# Patient Record
Sex: FEMALE | Race: WHITE | NOT HISPANIC OR LATINO | Employment: OTHER | URBAN - METROPOLITAN AREA
[De-identification: names, ages, dates, MRNs, and addresses within clinical notes are randomized per-mention and may not be internally consistent; named-entity substitution may affect disease eponyms.]

---

## 2018-02-27 ENCOUNTER — OFFICE VISIT (OUTPATIENT)
Dept: GASTROENTEROLOGY | Facility: MEDICAL CENTER | Age: 73
End: 2018-02-27
Payer: MEDICARE

## 2018-02-27 VITALS
HEART RATE: 81 BPM | WEIGHT: 102 LBS | TEMPERATURE: 97.1 F | DIASTOLIC BLOOD PRESSURE: 62 MMHG | BODY MASS INDEX: 17 KG/M2 | SYSTOLIC BLOOD PRESSURE: 104 MMHG | HEIGHT: 65 IN

## 2018-02-27 DIAGNOSIS — D64.9 ANEMIA, NORMOCYTIC NORMOCHROMIC: Primary | ICD-10-CM

## 2018-02-27 DIAGNOSIS — R63.4 WEIGHT LOSS: ICD-10-CM

## 2018-02-27 PROBLEM — E78.5 HYPERLIPIDEMIA: Status: ACTIVE | Noted: 2018-02-27

## 2018-02-27 PROCEDURE — 99204 OFFICE O/P NEW MOD 45 MIN: CPT | Performed by: INTERNAL MEDICINE

## 2018-02-27 RX ORDER — MULTIVIT-MIN/IRON/FOLIC ACID/K 18-600-40
1 CAPSULE ORAL
COMMUNITY
End: 2018-04-29 | Stop reason: HOSPADM

## 2018-02-27 RX ORDER — SIMVASTATIN 20 MG
20 TABLET ORAL
COMMUNITY
End: 2018-04-29 | Stop reason: HOSPADM

## 2018-02-27 RX ORDER — METOPROLOL TARTRATE 50 MG/1
TABLET, FILM COATED ORAL
COMMUNITY
End: 2018-04-29 | Stop reason: HOSPADM

## 2018-02-27 RX ORDER — AMLODIPINE BESYLATE 10 MG/1
TABLET ORAL
COMMUNITY
Start: 2017-12-26 | End: 2018-04-29 | Stop reason: HOSPADM

## 2018-02-27 RX ORDER — SEVELAMER CARBONATE 800 MG/1
800 TABLET, FILM COATED ORAL
COMMUNITY
End: 2018-04-29 | Stop reason: HOSPADM

## 2018-02-27 RX ORDER — CINACALCET 30 MG/1
TABLET, FILM COATED ORAL
COMMUNITY
End: 2018-04-29 | Stop reason: HOSPADM

## 2018-02-27 RX ORDER — MELATONIN
COMMUNITY
End: 2018-04-29 | Stop reason: HOSPADM

## 2018-02-27 RX ORDER — POLYETHYLENE GLYCOL 3350 17 G/17G
255 POWDER, FOR SOLUTION ORAL ONCE
Status: CANCELLED | OUTPATIENT
Start: 2018-02-27 | End: 2018-02-27

## 2018-02-27 NOTE — PROGRESS NOTES
[facility] - Outpatient Consultation  Sofía Watson 74 y o  female MRN: [id number]  Encounter: [id number]          ASSESSMENT AND PLAN:      1  Anemia, normocytic normochromic  2  Weight loss    Normocytic anemia may be secondary to ESRD vs AVM vs ulcer  Will plan for GI tract evaluation beginning with EGD and colonoscopy  Next steps would be capsule endsocopy if above endoscopies are unrevealing  Will obtain iron studies    - Case request operating room: EGD AND COLONOSCOPY; Standing  - Case request operating room: EGD AND COLONOSCOPY  - Iron panel    ______________________________________________________________________    HPI:     Ms Rina Castrejon he is a 40-year-old female referred for evaluation of anemia  She has a past medical history of polycystic kidney disease on hemodialysis for the past five years, hypertension, hyperlipidemia  She comes in following routine labs checked by her nephrologist and primary care physician  Hemoglobin trend has fallen from baseline 10 9 to 8 1 over the past one year  The patient has noted no signs of overt GI bleeding; she denies blood in her stool, hematemesis, melena  She does complain of increasing fatigue and lethargy, which has been increasing over the last two weeks  She also complains of increased nausea and vomiting associated with her dialysis sessions  She otherwise denies abdominal pain, early satiety  She has lost 6-7 lb within last month  Additionally she is scheduled for a CT chest, abdomen, pelvis  on Thursday March 1st     She has undergone EGD and colonoscopy over five years ago prior to her initiation of hemodialysis  From her recollection these were within normal limits and she had no colonic polyps  REVIEW OF SYSTEMS:    CONSTITUTIONAL: Denies any fever, chills, rigors, ++ weight loss  HEENT: No earache or tinnitus  Denies hearing loss or visual disturbances  CARDIOVASCULAR: No chest pain or palpitations  RESPIRATORY: Denies any cough, hemoptysis, shortness of breath or dyspnea on exertion  GASTROINTESTINAL: As noted in the History of Present Illness  GENITOURINARY: No problems with urination  Denies any hematuria or dysuria  NEUROLOGIC: No dizziness or vertigo, denies headaches  + fatigue, lethargy  MUSCULOSKELETAL: Denies any muscle or joint pain  SKIN: Denies skin rashes or itching  ENDOCRINE: Denies excessive thirst  Denies intolerance to heat or cold  PSYCHOSOCIAL: Denies depression or anxiety  Denies any recent memory loss  Historical Information   Past Medical History:   Diagnosis Date    Chronic kidney disease     Dyslipidemia      No past surgical history on file  Social History   History   Alcohol Use No     History   Drug Use No     History   Smoking Status    Never Smoker   Smokeless Tobacco    Never Used     No family history on file  Meds/Allergies       Current Outpatient Prescriptions:     amLODIPine (NORVASC) 10 mg tablet    Cholecalciferol (VITAMIN D) 2000 units CAPS    cholecalciferol (VITAMIN D3) 1,000 units tablet    cinacalcet (SENSIPAR) 30 mg tablet    metoprolol tartrate (LOPRESSOR) 50 mg tablet    sevelamer carbonate (RENVELA) 800 mg tablet    simvastatin (ZOCOR) 20 mg tablet    No Known Allergies        Objective     Blood pressure 104/62, pulse 81, temperature (!) 97 1 °F (36 2 °C), temperature source Tympanic, height 5' 5" (1 651 m), weight 46 3 kg (102 lb)  Body mass index is 16 97 kg/m²  PHYSICAL EXAM:      General Appearance:   Alert, cooperative, no distress   HEENT:   Normocephalic, atraumatic, anicteric      Neck:  Supple, symmetrical, trachea midline   Lungs:   Clear to auscultation bilaterally; no rales, rhonchi or wheezing; respirations unlabored    Heart[de-identified]   Regular rate and rhythm; no murmur, rub, or gallop     Abdomen:   Non-tender, mildly distended; normal bowel sounds; cysts of bilateral kidneys in lower quadrants palpable to epigastrium Genitalia:   Deferred    Rectal:   Deferred    Extremities:  No cyanosis, clubbing or edema; LUE fistula + bruit   Pulses:  2+ and symmetric    Skin:  No jaundice, rashes, or lesions    Lymph nodes:  No palpable cervical lymphadenopathy        Lab Results:   No visits with results within 1 Day(s) from this visit  Latest known visit with results is:   Lab Requisition on 02/26/2018   Component Date Value    Hemoglobin 02/26/2018 8 1*    Potassium 02/26/2018 4 8      Hgb 5/10/2016 10 9   Hgb 2/25/2018 9 4 MCV 97    Radiology Results:   No results found

## 2018-02-27 NOTE — LETTER
February 27, 2018     Nimesh Frederick MD  [address] [facility] [address] [facility] [facility] [address]    Patient: Eduardo Seth   YOB: 1945   Date of Visit: 2/27/2018       Dear Dr Faisal Sim: Thank you for referring Ottoniel Armenta to me for evaluation  Below are my notes for this consultation  Please feel free to contact me with questions or concerns  I look forward to following your patient along with you  Sincerely,      HANK V[facility]  [facility]  Mobile: [phone]  Available on [email or web address]             CC: DO Shelton Burleson MD  2/27/2018  1:24 PM  Sign at close encounter  [facility] - Outpatient Consultation  Eduardo Seth 74 y o  female MRN: [id number]  Encounter: [id number]          ASSESSMENT AND PLAN:      1  Anemia, normocytic normochromic  2  Weight loss    Normocytic anemia may be secondary to ESRD vs AVM vs ulcer  Will plan for GI tract evaluation beginning with EGD and colonoscopy  Next steps would be capsule endsocopy if above endoscopies are unrevealing  Will obtain iron studies    - Case request operating room: EGD AND COLONOSCOPY; Standing  - Case request operating room: EGD AND COLONOSCOPY  - Iron panel    ______________________________________________________________________    HPI:     Ms Renee Joya he is a 75-year-old female referred for evaluation of anemia  She has a past medical history of polycystic kidney disease on hemodialysis for the past five years, hypertension, hyperlipidemia  She comes in following routine labs checked by her nephrologist and primary care physician  Hemoglobin trend has fallen from baseline 10 9 to 8 1 over the past one year  The patient has noted no signs of overt GI bleeding; she denies blood in her stool, hematemesis, melena  She does complain of increasing fatigue and lethargy, which has been increasing over the last two weeks    She also complains of increased nausea and vomiting associated with her dialysis sessions  She otherwise denies abdominal pain, early satiety  She has lost 6-7 lb within last month  Additionally she is scheduled for a CT chest, abdomen, pelvis  on Thursday March 1st     She has undergone EGD and colonoscopy over five years ago prior to her initiation of hemodialysis  From her recollection these were within normal limits and she had no colonic polyps  REVIEW OF SYSTEMS:    CONSTITUTIONAL: Denies any fever, chills, rigors, ++ weight loss  HEENT: No earache or tinnitus  Denies hearing loss or visual disturbances  CARDIOVASCULAR: No chest pain or palpitations  RESPIRATORY: Denies any cough, hemoptysis, shortness of breath or dyspnea on exertion  GASTROINTESTINAL: As noted in the History of Present Illness  GENITOURINARY: No problems with urination  Denies any hematuria or dysuria  NEUROLOGIC: No dizziness or vertigo, denies headaches  + fatigue, lethargy  MUSCULOSKELETAL: Denies any muscle or joint pain  SKIN: Denies skin rashes or itching  ENDOCRINE: Denies excessive thirst  Denies intolerance to heat or cold  PSYCHOSOCIAL: Denies depression or anxiety  Denies any recent memory loss  Historical Information   Past Medical History:   Diagnosis Date    Chronic kidney disease     Dyslipidemia      No past surgical history on file  Social History   History   Alcohol Use No     History   Drug Use No     History   Smoking Status    Never Smoker   Smokeless Tobacco    Never Used     No family history on file      Meds/Allergies       Current Outpatient Prescriptions:     amLODIPine (NORVASC) 10 mg tablet    Cholecalciferol (VITAMIN D) 2000 units CAPS    cholecalciferol (VITAMIN D3) 1,000 units tablet    cinacalcet (SENSIPAR) 30 mg tablet    metoprolol tartrate (LOPRESSOR) 50 mg tablet    sevelamer carbonate (RENVELA) 800 mg tablet    simvastatin (ZOCOR) 20 mg tablet    No Known Allergies        Objective     Blood pressure 104/62, pulse 81, temperature (!) 97 1 °F (36 2 °C), temperature source Tympanic, height 5' 5" (1 651 m), weight 46 3 kg (102 lb)  Body mass index is 16 97 kg/m²  PHYSICAL EXAM:      General Appearance:   Alert, cooperative, no distress   HEENT:   Normocephalic, atraumatic, anicteric      Neck:  Supple, symmetrical, trachea midline   Lungs:   Clear to auscultation bilaterally; no rales, rhonchi or wheezing; respirations unlabored    Heart[de-identified]   Regular rate and rhythm; no murmur, rub, or gallop  Abdomen:   Non-tender, mildly distended; normal bowel sounds; cysts of bilateral kidneys in lower quadrants palpable to epigastrium   Genitalia:   Deferred    Rectal:   Deferred    Extremities:  No cyanosis, clubbing or edema; LUE fistula + bruit   Pulses:  2+ and symmetric    Skin:  No jaundice, rashes, or lesions    Lymph nodes:  No palpable cervical lymphadenopathy        Lab Results:   No visits with results within 1 Day(s) from this visit  Latest known visit with results is:   Lab Requisition on 02/26/2018   Component Date Value    Hemoglobin 02/26/2018 8 1*    Potassium 02/26/2018 4 8      Hgb 5/10/2016 10 9   Hgb 2/25/2018 9 4 MCV 97    Radiology Results:   No results found

## 2018-03-19 ENCOUNTER — HOSPITAL ENCOUNTER (OUTPATIENT)
Dept: RADIOLOGY | Facility: HOSPITAL | Age: 73
Discharge: HOME | End: 2018-03-19
Attending: INTERNAL MEDICINE | Admitting: RADIOLOGY
Payer: MEDICARE

## 2018-03-19 VITALS — HEART RATE: 86 BPM | OXYGEN SATURATION: 94 % | SYSTOLIC BLOOD PRESSURE: 157 MMHG | DIASTOLIC BLOOD PRESSURE: 76 MMHG

## 2018-03-19 DIAGNOSIS — N18.6 END STAGE RENAL DISEASE (HCC): ICD-10-CM

## 2018-03-19 PROCEDURE — 36905 THRMBC/NFS DIALYSIS CIRCUIT: CPT

## 2018-03-19 PROCEDURE — C1757 CATH, THROMBECTOMY/EMBOLECT: HCPCS

## 2018-03-19 PROCEDURE — 36905 THRMBC/NFS DIALYSIS CIRCUIT: CPT | Performed by: RADIOLOGY

## 2018-03-19 PROCEDURE — C1894 INTRO/SHEATH, NON-LASER: HCPCS

## 2018-03-19 PROCEDURE — C1725 CATH, TRANSLUMIN NON-LASER: HCPCS

## 2018-03-19 PROCEDURE — C1769 GUIDE WIRE: HCPCS

## 2018-03-19 RX ORDER — FENTANYL CITRATE 50 UG/ML
INJECTION, SOLUTION INTRAMUSCULAR; INTRAVENOUS CODE/TRAUMA/SEDATION MEDICATION
Status: COMPLETED | OUTPATIENT
Start: 2018-03-19 | End: 2018-03-19

## 2018-03-19 RX ADMIN — FENTANYL CITRATE 50 MCG: 50 INJECTION, SOLUTION INTRAMUSCULAR; INTRAVENOUS at 17:41

## 2018-03-19 RX ADMIN — IOHEXOL 55 ML: 300 INJECTION, SOLUTION INTRAVENOUS at 18:51

## 2018-03-19 NOTE — DISCHARGE INSTRUCTIONS
Fistulagram   WHAT YOU NEED TO KNOW:   Your arm or leg my  be sore, swollen, and bruised after the procedure  This is normal and should get better in a few days  DISCHARGE INSTRUCTIONS:     Contact Interventional Radiology at [phone] Yasmin PATIENTS: Contact Interventional Radiology at [phone]) Ольгаandrés Corbin PATIENTS: Contact Interventional Radiology at [phone]) if:    · You have a fever or chills  · Your puncture site is red, swollen, or draining pus  · You have nausea or are vomiting  · Your skin is itchy, swollen, or you have a rash  · You cannot feel a thrill over your graft or fistula  · You have questions or concerns about your condition or care  Seek care immediately if:     · You have bleeding that does not stop after 10 minutes of holding firm, direct pressure over the puncture site  · Blood soaks through your bandage  · Your hand or foot closest to the graft or fistula feels cold, painful, or numb  · Your hand or foot closest to the graft or fistula is pale or blue  · You have trouble moving your arm or leg closest to the graft or fistula  · Your bruise suddenly gets bigger  Care for your wound as directed:  Remove the bandage in 4 to 6 hours or as         directed  Wash the area once a day with soap and water  Gently pat the area dry  Apply firm, steady pressure to the puncture site if it bleeds  Use a clean gauze or towel to hold pressure for 10 to 15 minutes  Call 911 if you cannot stop the bleeding or the bleeding gets heavier  Feel for a thrill once a day or as directed  Place your index and second finger over your fistula or graft as directed  You should feel a vibration  The vibration means that blood is flowing through your graft or fistula correctly  Rest your arm or leg as directed  Do not lift anything heavier than 5 pounds or do strenuous activity for 24 hours  Prevent damage to your graft or fistula    Do not wear tight-fitting clothing over your graft or fistula  Do not wear tight jewelry on the arm or leg with the graft or fistula  Tell healthcare providers not to do, IVs, blood draws, and blood pressure readings in the arm with your graft or fistula  Do not allow flu shots or vaccinations in your arm with your graft or fistula      Follow up with your healthcare provider as directed

## 2018-03-29 ENCOUNTER — TELEPHONE (OUTPATIENT)
Dept: GASTROENTEROLOGY | Facility: CLINIC | Age: 73
End: 2018-03-29

## 2018-04-19 ENCOUNTER — APPOINTMENT (OUTPATIENT)
Dept: LAB | Facility: HOSPITAL | Age: 73
DRG: 682 | End: 2018-04-19
Attending: INTERNAL MEDICINE
Payer: MEDICARE

## 2018-04-19 ENCOUNTER — TRANSCRIBE ORDERS (OUTPATIENT)
Dept: LAB | Facility: HOSPITAL | Age: 73
End: 2018-04-19

## 2018-04-19 DIAGNOSIS — D55.9 ANEMIA DUE TO ENZYME DISORDER (HCC): ICD-10-CM

## 2018-04-19 DIAGNOSIS — D55.9 ANEMIA DUE TO ENZYME DISORDER (HCC): Primary | ICD-10-CM

## 2018-04-19 LAB
ABO GROUP BLD: NORMAL
BLD GP AB SCN SERPL QL: NEGATIVE
RH BLD: NEGATIVE
SPECIMEN EXPIRATION DATE: NORMAL

## 2018-04-19 PROCEDURE — 86901 BLOOD TYPING SEROLOGIC RH(D): CPT

## 2018-04-19 PROCEDURE — 36415 COLL VENOUS BLD VENIPUNCTURE: CPT

## 2018-04-19 PROCEDURE — 86900 BLOOD TYPING SEROLOGIC ABO: CPT

## 2018-04-19 PROCEDURE — 86850 RBC ANTIBODY SCREEN: CPT

## 2018-04-20 ENCOUNTER — APPOINTMENT (EMERGENCY)
Dept: RADIOLOGY | Facility: HOSPITAL | Age: 73
DRG: 682 | End: 2018-04-20
Payer: MEDICARE

## 2018-04-20 ENCOUNTER — HOSPITAL ENCOUNTER (INPATIENT)
Facility: HOSPITAL | Age: 73
LOS: 9 days | DRG: 682 | End: 2018-04-29
Attending: EMERGENCY MEDICINE | Admitting: INTERNAL MEDICINE
Payer: MEDICARE

## 2018-04-20 DIAGNOSIS — R53.1 WEAKNESS: Primary | ICD-10-CM

## 2018-04-20 DIAGNOSIS — R63.4 WEIGHT LOSS: ICD-10-CM

## 2018-04-20 DIAGNOSIS — D64.9 SYMPTOMATIC ANEMIA: ICD-10-CM

## 2018-04-20 DIAGNOSIS — N18.6 ESRD (END STAGE RENAL DISEASE) (HCC): ICD-10-CM

## 2018-04-20 PROBLEM — E46 PROTEIN CALORIE MALNUTRITION (HCC): Status: ACTIVE | Noted: 2018-04-20

## 2018-04-20 PROBLEM — R77.8 ELEVATED TROPONIN: Status: ACTIVE | Noted: 2018-04-20

## 2018-04-20 LAB
ALBUMIN SERPL BCP-MCNC: 1.8 G/DL (ref 3.5–5)
ALP SERPL-CCNC: 121 U/L (ref 46–116)
ALT SERPL W P-5'-P-CCNC: 7 U/L (ref 12–78)
ANION GAP SERPL CALCULATED.3IONS-SCNC: 8 MMOL/L (ref 4–13)
AST SERPL W P-5'-P-CCNC: 18 U/L (ref 5–45)
BASOPHILS # BLD MANUAL: 0 THOUSAND/UL (ref 0–0.1)
BASOPHILS NFR MAR MANUAL: 0 % (ref 0–1)
BILIRUB SERPL-MCNC: 1.18 MG/DL (ref 0.2–1)
BUN SERPL-MCNC: 20 MG/DL (ref 5–25)
CALCIUM SERPL-MCNC: 9.1 MG/DL (ref 8.3–10.1)
CHLORIDE SERPL-SCNC: 99 MMOL/L (ref 100–108)
CO2 SERPL-SCNC: 28 MMOL/L (ref 21–32)
CREAT SERPL-MCNC: 2.93 MG/DL (ref 0.6–1.3)
EOSINOPHIL # BLD MANUAL: 0 THOUSAND/UL (ref 0–0.4)
EOSINOPHIL NFR BLD MANUAL: 0 % (ref 0–6)
ERYTHROCYTE [DISTWIDTH] IN BLOOD BY AUTOMATED COUNT: 17.8 % (ref 11.6–15.1)
GFR SERPL CREATININE-BSD FRML MDRD: 15 ML/MIN/1.73SQ M
GLUCOSE SERPL-MCNC: 102 MG/DL (ref 65–140)
HCT VFR BLD AUTO: 24.3 % (ref 34.8–46.1)
HGB BLD-MCNC: 7.6 G/DL (ref 11.5–15.4)
LYMPHOCYTES # BLD AUTO: 0.34 THOUSAND/UL (ref 0.6–4.47)
LYMPHOCYTES # BLD AUTO: 5 % (ref 14–44)
MCH RBC QN AUTO: 28.5 PG (ref 26.8–34.3)
MCHC RBC AUTO-ENTMCNC: 31.3 G/DL (ref 31.4–37.4)
MCV RBC AUTO: 91 FL (ref 82–98)
MONOCYTES # BLD AUTO: 0.14 THOUSAND/UL (ref 0–1.22)
MONOCYTES NFR BLD: 2 % (ref 4–12)
NEUTROPHILS # BLD MANUAL: 6.32 THOUSAND/UL (ref 1.85–7.62)
NEUTS SEG NFR BLD AUTO: 93 % (ref 43–75)
NRBC BLD AUTO-RTO: 0 /100 WBCS
OVALOCYTES BLD QL SMEAR: PRESENT
PLATELET # BLD AUTO: 102 THOUSANDS/UL (ref 149–390)
PLATELET BLD QL SMEAR: ABNORMAL
PMV BLD AUTO: 12.2 FL (ref 8.9–12.7)
POIKILOCYTOSIS BLD QL SMEAR: PRESENT
POTASSIUM SERPL-SCNC: 4.1 MMOL/L (ref 3.5–5.3)
PROT SERPL-MCNC: 6.7 G/DL (ref 6.4–8.2)
RBC # BLD AUTO: 2.67 MILLION/UL (ref 3.81–5.12)
RBC MORPH BLD: PRESENT
SODIUM SERPL-SCNC: 135 MMOL/L (ref 136–145)
TROPONIN I SERPL-MCNC: 0.08 NG/ML
TROPONIN I SERPL-MCNC: 0.09 NG/ML
WBC # BLD AUTO: 6.8 THOUSAND/UL (ref 4.31–10.16)

## 2018-04-20 PROCEDURE — 80053 COMPREHEN METABOLIC PANEL: CPT | Performed by: EMERGENCY MEDICINE

## 2018-04-20 PROCEDURE — 84484 ASSAY OF TROPONIN QUANT: CPT | Performed by: PHYSICIAN ASSISTANT

## 2018-04-20 PROCEDURE — 30233N1 TRANSFUSION OF NONAUTOLOGOUS RED BLOOD CELLS INTO PERIPHERAL VEIN, PERCUTANEOUS APPROACH: ICD-10-PCS | Performed by: INTERNAL MEDICINE

## 2018-04-20 PROCEDURE — 99285 EMERGENCY DEPT VISIT HI MDM: CPT

## 2018-04-20 PROCEDURE — 84484 ASSAY OF TROPONIN QUANT: CPT | Performed by: EMERGENCY MEDICINE

## 2018-04-20 PROCEDURE — 93005 ELECTROCARDIOGRAM TRACING: CPT | Performed by: EMERGENCY MEDICINE

## 2018-04-20 PROCEDURE — 85007 BL SMEAR W/DIFF WBC COUNT: CPT | Performed by: EMERGENCY MEDICINE

## 2018-04-20 PROCEDURE — 86920 COMPATIBILITY TEST SPIN: CPT

## 2018-04-20 PROCEDURE — 36415 COLL VENOUS BLD VENIPUNCTURE: CPT | Performed by: EMERGENCY MEDICINE

## 2018-04-20 PROCEDURE — 85027 COMPLETE CBC AUTOMATED: CPT | Performed by: EMERGENCY MEDICINE

## 2018-04-20 PROCEDURE — 99223 1ST HOSP IP/OBS HIGH 75: CPT | Performed by: INTERNAL MEDICINE

## 2018-04-20 PROCEDURE — 71046 X-RAY EXAM CHEST 2 VIEWS: CPT

## 2018-04-20 PROCEDURE — 93005 ELECTROCARDIOGRAM TRACING: CPT

## 2018-04-20 RX ORDER — POLYETHYLENE GLYCOL 3350 17 G/17G
17 POWDER, FOR SOLUTION ORAL DAILY PRN
Status: DISCONTINUED | OUTPATIENT
Start: 2018-04-20 | End: 2018-04-27

## 2018-04-20 RX ORDER — PANTOPRAZOLE SODIUM 40 MG/1
40 TABLET, DELAYED RELEASE ORAL
COMMUNITY
Start: 2018-03-03 | End: 2018-04-29 | Stop reason: HOSPADM

## 2018-04-20 RX ORDER — PRAVASTATIN SODIUM 40 MG
40 TABLET ORAL
Status: DISCONTINUED | OUTPATIENT
Start: 2018-04-20 | End: 2018-04-28

## 2018-04-20 RX ORDER — MELATONIN
1000 DAILY
Status: DISCONTINUED | OUTPATIENT
Start: 2018-04-21 | End: 2018-04-28

## 2018-04-20 RX ORDER — POLYETHYLENE GLYCOL 3350 17 G/17G
255 POWDER, FOR SOLUTION ORAL ONCE
Status: DISCONTINUED | OUTPATIENT
Start: 2018-04-20 | End: 2018-04-20

## 2018-04-20 RX ORDER — CALCIUM CARBONATE 200(500)MG
1000 TABLET,CHEWABLE ORAL DAILY PRN
Status: DISCONTINUED | OUTPATIENT
Start: 2018-04-20 | End: 2018-04-29 | Stop reason: HOSPADM

## 2018-04-20 RX ORDER — ACETAMINOPHEN 325 MG/1
650 TABLET ORAL EVERY 6 HOURS PRN
Status: DISCONTINUED | OUTPATIENT
Start: 2018-04-20 | End: 2018-04-29 | Stop reason: HOSPADM

## 2018-04-20 RX ORDER — AMLODIPINE BESYLATE 10 MG/1
10 TABLET ORAL DAILY
Status: DISCONTINUED | OUTPATIENT
Start: 2018-04-21 | End: 2018-04-21

## 2018-04-20 RX ORDER — HEPARIN SODIUM 5000 [USP'U]/ML
5000 INJECTION, SOLUTION INTRAVENOUS; SUBCUTANEOUS EVERY 8 HOURS SCHEDULED
Status: DISCONTINUED | OUTPATIENT
Start: 2018-04-20 | End: 2018-04-28

## 2018-04-20 RX ORDER — ONDANSETRON 2 MG/ML
4 INJECTION INTRAMUSCULAR; INTRAVENOUS EVERY 6 HOURS PRN
Status: DISCONTINUED | OUTPATIENT
Start: 2018-04-20 | End: 2018-04-27

## 2018-04-20 RX ORDER — CINACALCET 30 MG/1
30 TABLET, FILM COATED ORAL
Status: DISCONTINUED | OUTPATIENT
Start: 2018-04-21 | End: 2018-04-29 | Stop reason: HOSPADM

## 2018-04-20 RX ORDER — PANTOPRAZOLE SODIUM 40 MG/1
40 TABLET, DELAYED RELEASE ORAL
Status: DISCONTINUED | OUTPATIENT
Start: 2018-04-21 | End: 2018-04-29 | Stop reason: HOSPADM

## 2018-04-20 RX ORDER — SEVELAMER HYDROCHLORIDE 800 MG/1
800 TABLET, FILM COATED ORAL
Status: DISCONTINUED | OUTPATIENT
Start: 2018-04-20 | End: 2018-04-29 | Stop reason: HOSPADM

## 2018-04-20 RX ADMIN — PRAVASTATIN SODIUM 40 MG: 40 TABLET ORAL at 18:36

## 2018-04-20 RX ADMIN — RENAGEL 800 MG: 800 TABLET ORAL at 18:36

## 2018-04-20 RX ADMIN — HEPARIN SODIUM 5000 UNITS: 5000 INJECTION, SOLUTION INTRAVENOUS; SUBCUTANEOUS at 18:36

## 2018-04-20 NOTE — H&P
H&P- Ronnald Schilder 1945, 74 y o  female MRN: [id number]    Unit/Bed#: WILFRID Encounter: [id number]    Primary Care Provider: Alba Campo MD   Date and time admitted to hospital: 4/20/2018  1:40 PM    * Weakness   Assessment & Plan    · Weakness and failure to thrive- no longer safe to take care of herself at home  · PT/OT  · Consult with case mgmt  · Check TSH        Symptomatic anemia   Assessment & Plan    · Was due to outpatient blood transfusion tomorrow  · Patient would benefit from transfusion at this point given symptoms- ideally could coordinate this with dialysis  · Also was referred to hematologist to r/o hematologic disorder contributing- will obtain heme/onc consult  · Earlier this year underwent EGD/colonscopy at [facility]: Colonoscopy with large internal and external hemorrhoids and small non-bleeding rectal ulcer, EGD with antral gastritis, non erosive duodenitis and hiatal hernia        Elevated troponin   Assessment & Plan    · Troponin mildly elevated- no EKG changes or chest pain  · Trend        Weight loss   Assessment & Plan    · Patient with significant weight loss, appears cachectic  BMI 16 14  · Nutrition evaluation  · Recent colonoscopy and CT c/a/p done at [facility] not revealing of cause          End stage renal disease ([facility] [address] )   Assessment & Plan    · Dialysis MWF  · Having issues with vomiting and hypotension during dialysis  · Nephrology consult for management          VTE Prophylaxis: Heparin  / sequential compression device   Code Status: Full code  POLST: There is no POLST form on file for this patient (pre-hospital)  Discussion with family: Discussed with son and daughter  Anticipated Length of Stay:  Patient will be admitted on an Inpatient basis with an anticipated length of stay of  > 2 midnights  Justification for Hospital Stay: Symptomatic anemia, PT/OT evals  Not safe at home    Total Time for Visit, including Counseling / Coordination of Care: 1 hour    Greater than 50% of this total time spent on direct patient counseling and coordination of care  Chief Complaint:   Weakness    History of Present Illness:    Nichole Parker is a 74 y o  female with a history of ESRD secondary to polycystic kidney disease and anemia who presents with weakness and fatigue  Weakness and fatigue have been ongoing for several weeks, however have been worsening  Today, patient overslept for dialysis  Her son came to pick her up and brought her to dialysis  He states she has stop and rest several times while trying to get dialysis due to fatigue and weakness  He also notes she gets short of breath when she walks  During dialysis, patient's systolic blood pressure was in the 80s  Patient was sent here for evaluation and family is concerned she is unable to care for herself at home anymore  She lives at home by herself  Patient was supposed to get outpatient blood transfusion tomorrow arranged by her nephrologist for symptomatic anemia  She also had an upcoming appointment for evaluation by an oncologist due to anemia  She has lost around 20 lb  Her son states that she does eat well when she is with him but has difficulty preparing meals for herself at home  Reports vomiting during dialysis sessions  She is also newly incontinent of stool  Denies fever, chills, night sweats  No recent medication changes  No focal weakness, numbness, or tingling  Patient had two recent admissions for weakness/fatigue at [facility] and was found to have anemia requiring transfusions  She had a colonoscopy which showed hemorrhoids and nonbleeding rectal ulcer  She also had an EGD which showed gastritis and duodenitis  CT of chest abdomen pelvis showed mild ascites, cholelithiasis, diverticulosis, and innumerable bilateral renal and hepatic cysts      Review of Systems:    Review of Systems    Past Medical and Surgical History:     Past Medical History:   Diagnosis Date    Chronic kidney disease     Dyslipidemia History reviewed  No pertinent surgical history  Meds/Allergies:    Prior to Admission medications    Medication Sig Start Date End Date Taking? Authorizing Provider   amLODIPine (NORVASC) 10 mg tablet TAKE 1 BY MOUTH DAILY 12/26/17  Yes Historical Provider, MD   Cholecalciferol (VITAMIN D) 2000 units CAPS Take 1 tablet by mouth   Yes Historical Provider, MD   cholecalciferol (VITAMIN D3) 1,000 units tablet Take by mouth   Yes Historical Provider, MD   cinacalcet (SENSIPAR) 30 mg tablet Take by mouth   Yes Historical Provider, MD   metoprolol tartrate (LOPRESSOR) 50 mg tablet Take by mouth   Yes Historical Provider, MD   pantoprazole (PROTONIX) 40 mg tablet Take 40 mg by mouth 3/3/18 3/3/19 Yes Historical Provider, MD   sevelamer carbonate (RENVELA) 800 mg tablet Take 800 mg by mouth   Yes Historical Provider, MD   simvastatin (ZOCOR) 20 mg tablet Take 20 mg by mouth   Yes Historical Provider, MD     I have reviewed home medications with patient personally  Allergies: No Known Allergies    Social History:     Marital Status:    Occupation:    Patient Pre-hospital Living Situation: Lives at home  Patient Pre-hospital Level of Mobility: Uses a cane  Patient Pre-hospital Diet Restrictions: None  Substance Use History:   History   Alcohol Use No     History   Smoking Status    Never Smoker   Smokeless Tobacco    Never Used     History   Drug Use No       Family History:    non-contributory    Physical Exam:     Vitals:   Blood Pressure: 110/55 (04/20/18 1529)  Pulse: 83 (04/20/18 1529)  Temperature: (!) 97 4 °F (36 3 °C) (04/20/18 1356)  Temp Source: Oral (04/20/18 1356)  Respirations: 20 (04/20/18 1529)  Weight - Scale: 44 kg (97 lb) (04/20/18 1346)  SpO2: 97 % (04/20/18 1529)    Physical Exam   Constitutional: She is oriented to person, place, and time  Appears cachectic and fatigued   HENT:   Head: Normocephalic and atraumatic  Eyes: No scleral icterus  Neck: Normal range of motion   Neck supple  Cardiovascular: Normal rate, regular rhythm and normal heart sounds  Pulmonary/Chest: Effort normal and breath sounds normal  No respiratory distress  She has no wheezes  She has no rales  Abdominal:   +Palpable kidneys  Mild tenderness right abdomen- normal per patient   Musculoskeletal: She exhibits no edema  Neurological: She is alert and oriented to person, place, and time  Skin: There is pallor  Psychiatric: She has a normal mood and affect  Her behavior is normal  Thought content normal        Additional Data:     Lab Results: I have personally reviewed pertinent reports  Results from last 7 days  Lab Units 04/20/18  1453   WBC Thousand/uL 6 80   HEMOGLOBIN g/dL 7 6*   HEMATOCRIT % 24 3*   PLATELETS Thousands/uL 102*       Results from last 7 days  Lab Units 04/20/18  1453   SODIUM mmol/L 135*   POTASSIUM mmol/L 4 1   CHLORIDE mmol/L 99*   CO2 mmol/L 28   BUN mg/dL 20   CREATININE mg/dL 2 93*   CALCIUM mg/dL 9 1   TOTAL PROTEIN g/dL 6 7   BILIRUBIN TOTAL mg/dL 1 18*   ALK PHOS U/L 121*   ALT U/L 7*   AST U/L 18   GLUCOSE RANDOM mg/dL 102           Imaging: I have personally reviewed pertinent reports  XR chest 2 views   Final Result by Yoana Fowler MD (04/20 1604)      No acute cardiopulmonary disease  Workstation performed: [id number]             EKG, Pathology, and Other Studies Reviewed on Admission:   · EKG: Normal sinus rhythm with PVCs    Allscripts / Epic Records Reviewed: Yes     ** Please Note: This note has been constructed using a voice recognition system   **

## 2018-04-20 NOTE — ASSESSMENT & PLAN NOTE
· Patient with significant weight loss, appears cachectic   BMI 16 14  · Nutrition evaluation  · Recent colonoscopy and CT c/a/p done at [facility] not revealing of cause

## 2018-04-20 NOTE — ASSESSMENT & PLAN NOTE
· Weakness and failure to thrive- no longer safe to take care of herself at home  · PT/OT  · Consult with case mgmt  · Check TSH

## 2018-04-20 NOTE — ASSESSMENT & PLAN NOTE
· Was due to outpatient blood transfusion tomorrow  · Patient would benefit from transfusion at this point given symptoms- ideally could coordinate this with dialysis  · Also was referred to hematologist to r/o hematologic disorder contributing- will obtain heme/onc consult  · Earlier this year underwent EGD/colonscopy at [facility]: Colonoscopy with large internal and external hemorrhoids and small non-bleeding rectal ulcer, EGD with antral gastritis, non erosive duodenitis and hiatal hernia

## 2018-04-20 NOTE — ED PROVIDER NOTES
History  Chief Complaint   Patient presents with    Weakness - Generalized     pt sent from dialysis where she was lethergic and weak  also sent for low H&H     This is a 15-year-old female with history of polycystic kidney disease, end-stage renal disease on dialysis (MWF), AFib not on anticoagulation, anemia of chronic disease who presents with progressive weakness  According to the patient and family, the patient has been experiencing progressive weakness and tiredness over the past few months  This morning, she overslept and missed her bus to dialysis  Her son came to pick her up and take her to dialysis  He noticed more diffuse weakness and difficulty getting into his car  During dialysis, she was noted to be very tired and had a blood pressure with a systolic in the 93H according to her son  She was sent here for evaluation  Of note, she only received approximately 1/2 of her dialysis treatment  As noted earlier, she has experienced progressive weakness over the past few months  She has also become more dyspneic on exertion  She had blood work done 2 days ago which revealed a hemoglobin of 7 4  She is actually scheduled to have a blood transfusion tomorrow  She has been hospitalized at [facility] over the past couple months for blood transfusions  She has also been evaluated by GI for possible GI bleed  According the patient, a stool sample was sent off for evaluation  The patient has no other complaints today besides her chronic abdominal pain secondary to her polycystic kidney disease  Denies fever/chills, nausea/vomiting, lightheadedness/dizziness, numbness, unilateral numbness/weakness, headache, change in vision, URI symptoms, neck pain, chest pain, palpitations, shortness of breath, cough, back pain, flank pain, diarrhea, hematochezia, melena, dysuria, hematuria, abnormal vaginal discharge/bleeding      Of note, the patient is refusing a rectal exam to test for blood in her stool  I explained that this will be a quick exam which will aid in our diagnosis  He will determine whether she has a GI bleed which may also explain her anemia  The patient understands and still refuses a rectal exam      Patient lives at home by herself  Feels like she is too weak to take care of herself at home and is interested in placement to a nursing facility  Family at bedside also agrees that she cannot take care of herself and will need placement  Prior to Admission Medications   Prescriptions Last Dose Informant Patient Reported? Taking? Cholecalciferol (VITAMIN D) 2000 units CAPS   Yes Yes   Sig: Take 1 tablet by mouth   amLODIPine (NORVASC) 10 mg tablet   Yes Yes   Sig: TAKE 1 BY MOUTH DAILY   cholecalciferol (VITAMIN D3) 1,000 units tablet   Yes Yes   Sig: Take by mouth   cinacalcet (SENSIPAR) 30 mg tablet   Yes Yes   Sig: Take by mouth   metoprolol tartrate (LOPRESSOR) 50 mg tablet   Yes Yes   Sig: Take by mouth   pantoprazole (PROTONIX) 40 mg tablet   Yes Yes   Sig: Take 40 mg by mouth   sevelamer carbonate (RENVELA) 800 mg tablet   Yes Yes   Sig: Take 800 mg by mouth   simvastatin (ZOCOR) 20 mg tablet   Yes Yes   Sig: Take 20 mg by mouth      Facility-Administered Medications: None       Past Medical History:   Diagnosis Date    Chronic kidney disease     Dyslipidemia        History reviewed  No pertinent surgical history  History reviewed  No pertinent family history  I have reviewed and agree with the history as documented  Social History   Substance Use Topics    Smoking status: Never Smoker    Smokeless tobacco: Never Used    Alcohol use No        Review of Systems   Constitutional: Negative for chills and fever  HENT: Negative for rhinorrhea, sore throat and trouble swallowing  Eyes: Negative for photophobia and visual disturbance  Respiratory: Positive for shortness of breath  Negative for cough and chest tightness      Cardiovascular: Negative for chest pain, palpitations and leg swelling  Gastrointestinal: Positive for abdominal pain (Chronic)  Negative for blood in stool, diarrhea, nausea and vomiting  Endocrine: Negative for polyuria  Genitourinary: Negative for dysuria, flank pain, hematuria, vaginal bleeding and vaginal discharge  Musculoskeletal: Negative for back pain and neck pain  Skin: Negative for color change and rash  Allergic/Immunologic: Negative for immunocompromised state  Neurological: Positive for weakness  Negative for dizziness, light-headedness, numbness and headaches  All other systems reviewed and are negative  Physical Exam  ED Triage Vitals   Temperature Pulse Respirations Blood Pressure SpO2   04/20/18 1356 04/20/18 1346 04/20/18 1346 04/20/18 1346 04/20/18 1356   (!) 97 4 °F (36 3 °C) 98 18 139/77 94 %      Temp Source Heart Rate Source Patient Position - Orthostatic VS BP Location FiO2 (%)   04/20/18 1356 04/20/18 1346 04/20/18 1430 04/20/18 1529 --   Oral Monitor Lying Right arm       Pain Score       --                  Orthostatic Vital Signs  Vitals:    04/20/18 1346 04/20/18 1430 04/20/18 1529   BP: 139/77 107/53 110/55   Pulse: 98 94 83   Patient Position - Orthostatic VS:  Lying Lying       Physical Exam   Constitutional: Vital signs are normal  She appears cachectic  She is cooperative  No distress  HENT:   Mouth/Throat: Uvula is midline, oropharynx is clear and moist and mucous membranes are normal    Eyes: Conjunctivae and EOM are normal  Pupils are equal, round, and reactive to light  Neck: Trachea normal  No thyroid mass and no thyromegaly present  Cardiovascular: Normal rate, regular rhythm, normal heart sounds, intact distal pulses and normal pulses  No murmur heard  Pulmonary/Chest: Effort normal and breath sounds normal    Abdominal: Soft  Normal appearance and bowel sounds are normal  There is generalized tenderness  There is no rebound, no guarding and no CVA tenderness     Palpable kidneys  Neurological: She is alert  Skin: Skin is warm, dry and intact  Psychiatric: She has a normal mood and affect  Her speech is normal and behavior is normal  Thought content normal        ED Medications  Medications - No data to display    Diagnostic Studies  Results Reviewed     Procedure Component Value Units Date/Time    Comprehensive metabolic panel [[id number]]  (Abnormal) Collected:  04/20/18 1453    Lab Status:  Final result Specimen:  Blood from Arm, Right Updated:  04/20/18 1527     Sodium 135 (L) mmol/L      Potassium 4 1 mmol/L      Chloride 99 (L) mmol/L      CO2 28 mmol/L      Anion Gap 8 mmol/L      BUN 20 mg/dL      Creatinine 2 93 (H) mg/dL      Glucose 102 mg/dL      Calcium 9 1 mg/dL      AST 18 U/L      ALT 7 (L) U/L      Alkaline Phosphatase 121 (H) U/L      Total Protein 6 7 g/dL      Albumin 1 8 (L) g/dL      Total Bilirubin 1 18 (H) mg/dL      eGFR 15 ml/min/1 73sq m     Narrative:         National Kidney Disease Education Program recommendations are as follows:  GFR calculation is accurate only with a steady state creatinine  Chronic Kidney disease less than 60 ml/min/1 73 sq  meters  Kidney failure less than 15 ml/min/1 73 sq  meters  Troponin I [[id number]]  (Abnormal) Collected:  04/20/18 1453    Lab Status:  Final result Specimen:  Blood from Arm, Right Updated:  04/20/18 1525     Troponin I 0 08 (H) ng/mL     Narrative:         [employer] Chemistry analyzer 99% cutoff is > 0 04 ng/mL in network labs    o cTnI 99% cutoff is useful only when applied to patients in the clinical setting of myocardial ischemia  o cTnI 99% cutoff should be interpreted in the context of clinical history, ECG findings and possibly cardiac imaging to establish correct diagnosis  o cTnI 99% cutoff may be suggestive but clearly not indicative of a coronary event without the clinical setting of myocardial ischemia      CBC and differential [[id number]]  (Abnormal) Collected:  04/20/18 1453    Lab Status: Preliminary result Specimen:  Blood from Arm, Right Updated:  04/20/18 1503     WBC 6 80 Thousand/uL      RBC 2 67 (L) Million/uL      Hemoglobin 7 6 (L) g/dL      Hematocrit 24 3 (L) %      MCV 91 fL      MCH 28 5 pg      MCHC 31 3 (L) g/dL      RDW 17 8 (H) %      MPV 12 2 fL      Platelets 509 (L) Thousands/uL                  XR chest 2 views   Final Result by Edie Osborne MD (04/20 1601)      No acute cardiopulmonary disease  Workstation performed: [id number]               Procedures  ECG 12 Lead Documentation  Date/Time: 4/20/2018 3:32 PM  Performed by: Barber Armando  Authorized by: Eva Valverde     ECG reviewed by me, the ED Provider: yes    Patient location:  ED  Previous ECG:     Previous ECG:  Unavailable  Interpretation:     Interpretation: abnormal    Rate:     ECG rate:  85    ECG rate assessment: normal    Rhythm:     Rhythm: sinus rhythm    Ectopy:     Ectopy: PVCs      PVCs:  Multifocal  QRS:     QRS axis:  Normal    QRS intervals:  Normal  Conduction:     Conduction: normal    ST segments:     ST segments:  Normal  T waves:     T waves: normal            Phone Consults  ED Phone Contact    ED Course  ED Course as of Apr 20 1632 Fri Apr 20, 2018   1508 WBC: 6 80   1508 Hemoglobin: (!) 7 6   1508 No previous Platelets: (!) 437   1528 Troponin I: (!) 0 08   1528 Sodium: (!) 135   1528 Potassium: 4 1   1528 BUN: 20   1528 No previous Creatinine: (!) 2 93                               [facility]  Number of Diagnoses or Management Options  Diagnosis management comments: Labs, EKG, chest x-ray  Likely admit      CritCare Time    Disposition  Final diagnoses:   Weakness   ESRD (end stage renal disease) ([facility] [address] )     Time reflects when diagnosis was documented in both MDM as applicable and the Disposition within this note     Time User Action Codes Description Comment    4/20/2018  3:47 PM Efrain Lopez Add [R53 1] Weakness     4/20/2018  3:47 PM Erich Valdovinos Add [N18 6] ESRD (end stage renal disease) [facility])       ED Disposition     ED Disposition Condition Comment    Admit  Case was discussed with Dr Osborn Husbands and the patient's admission status was agreed to be Admission Status: inpatient status to the service of [facility]  Follow-up Information    None       Patient's Medications   Discharge Prescriptions    No medications on file     No discharge procedures on file  ED Provider  Attending physically available and evaluated Ronnald Schilder I managed the patient along with the ED Attending      Electronically Signed by         Jose Anne MD  04/20/18 2819

## 2018-04-20 NOTE — ED ATTENDING ATTESTATION
Davis Erwin MD, saw and evaluated the patient  I have discussed the patient with the resident/non-physician practitioner and agree with the resident's/non-physician practitioner's findings, Plan of Care, and MDM as documented in the resident's/non-physician practitioner's note, except where noted  All available labs and Radiology studies were reviewed  At this point I agree with the current assessment done in the Emergency Department  I have conducted an independent evaluation of this patient a history and physical is as follows:  OA: 73 y/o f with h/o ESRD on dialysis, HTN, HLD, atrial fibrillation and anemia who presents with generalized weakness  Sxms have been progressive x months however this morning sxms were so severe that she overslept her bus for dialysis  Family brought her to dialysis where she was found to be hypotensive and did not complete her dialysis session  Also noted to be increasingly PETTY  + history of needing blood transfusion with hbg of 7/4 2 days ago and scheduled for transfusion tomorrow  No recent falls/trauma  No fevers/chills  No cp/pressure  PE, elderly f, pale and cachectic, PERRL, neck supple/FROM, RR, lungs decreased but CTAB, abd soft, + palpable L kidney, + mild ascites, - LE edema/calf ttp, + 2 distal pulses and capillary refill 2 sec  AAOx3  A/p generalized weakness, concern for anemia v volume overload v combination of processes, labs, imaging, EKG, monitor on telemetry, treat accordingly  Pt and son both discussed with me the need for increased level of care and that the pt is unable to perform her ADLs at this point as she lives alone, will need CM/social work consult for possible placement    Portions of the record may have been created with voice recognition software  Occasional wrong word or sound-a-like" substitutions may have occurred due to the inherent limitations of voice recognition software    Review chart carefully and recognize, using context, where substitutions have occurred    Critical Care Time  CritCare Time    Procedures

## 2018-04-20 NOTE — ASSESSMENT & PLAN NOTE
· Dialysis MWF  · Having issues with vomiting and hypotension during dialysis  · Nephrology consult for management

## 2018-04-21 ENCOUNTER — APPOINTMENT (INPATIENT)
Dept: DIALYSIS | Facility: HOSPITAL | Age: 73
DRG: 682 | End: 2018-04-21
Payer: MEDICARE

## 2018-04-21 ENCOUNTER — APPOINTMENT (INPATIENT)
Dept: RADIOLOGY | Facility: HOSPITAL | Age: 73
DRG: 682 | End: 2018-04-21
Payer: MEDICARE

## 2018-04-21 ENCOUNTER — HOSPITAL ENCOUNTER (OUTPATIENT)
Dept: INFUSION CENTER | Facility: HOSPITAL | Age: 73
End: 2018-04-21

## 2018-04-21 PROBLEM — R26.2 AMBULATORY DYSFUNCTION: Status: ACTIVE | Noted: 2018-03-14

## 2018-04-21 PROBLEM — M81.0 OSTEOPOROSIS: Status: ACTIVE | Noted: 2017-02-21

## 2018-04-21 LAB
FERRITIN SERPL-MCNC: 1747 NG/ML (ref 8–388)
FOLATE SERPL-MCNC: 3.5 NG/ML (ref 3.1–17.5)
IRON SATN MFR SERPL: 22 %
IRON SERPL-MCNC: 15 UG/DL (ref 50–170)
PLATELET # BLD AUTO: 72 THOUSANDS/UL (ref 149–390)
PMV BLD AUTO: 11.6 FL (ref 8.9–12.7)
TIBC SERPL-MCNC: 68 UG/DL (ref 250–450)
TROPONIN I SERPL-MCNC: 0.09 NG/ML
TSH SERPL DL<=0.05 MIU/L-ACNC: 3.13 UIU/ML (ref 0.36–3.74)
VIT B12 SERPL-MCNC: 610 PG/ML (ref 100–900)

## 2018-04-21 PROCEDURE — 83540 ASSAY OF IRON: CPT | Performed by: INTERNAL MEDICINE

## 2018-04-21 PROCEDURE — 82607 VITAMIN B-12: CPT | Performed by: INTERNAL MEDICINE

## 2018-04-21 PROCEDURE — 85049 AUTOMATED PLATELET COUNT: CPT | Performed by: PHYSICIAN ASSISTANT

## 2018-04-21 PROCEDURE — 99222 1ST HOSP IP/OBS MODERATE 55: CPT | Performed by: INTERNAL MEDICINE

## 2018-04-21 PROCEDURE — G8988 SELF CARE GOAL STATUS: HCPCS

## 2018-04-21 PROCEDURE — 74176 CT ABD & PELVIS W/O CONTRAST: CPT

## 2018-04-21 PROCEDURE — 86334 IMMUNOFIX E-PHORESIS SERUM: CPT | Performed by: INTERNAL MEDICINE

## 2018-04-21 PROCEDURE — 83550 IRON BINDING TEST: CPT | Performed by: INTERNAL MEDICINE

## 2018-04-21 PROCEDURE — 82668 ASSAY OF ERYTHROPOIETIN: CPT | Performed by: INTERNAL MEDICINE

## 2018-04-21 PROCEDURE — 97166 OT EVAL MOD COMPLEX 45 MIN: CPT

## 2018-04-21 PROCEDURE — P9040 RBC LEUKOREDUCED IRRADIATED: HCPCS

## 2018-04-21 PROCEDURE — 82728 ASSAY OF FERRITIN: CPT | Performed by: INTERNAL MEDICINE

## 2018-04-21 PROCEDURE — 99232 SBSQ HOSP IP/OBS MODERATE 35: CPT | Performed by: INTERNAL MEDICINE

## 2018-04-21 PROCEDURE — 82746 ASSAY OF FOLIC ACID SERUM: CPT | Performed by: INTERNAL MEDICINE

## 2018-04-21 PROCEDURE — 71250 CT THORAX DX C-: CPT

## 2018-04-21 PROCEDURE — 86334 IMMUNOFIX E-PHORESIS SERUM: CPT | Performed by: PATHOLOGY

## 2018-04-21 PROCEDURE — 99221 1ST HOSP IP/OBS SF/LOW 40: CPT | Performed by: INTERNAL MEDICINE

## 2018-04-21 PROCEDURE — 84165 PROTEIN E-PHORESIS SERUM: CPT | Performed by: INTERNAL MEDICINE

## 2018-04-21 PROCEDURE — 84443 ASSAY THYROID STIM HORMONE: CPT | Performed by: PHYSICIAN ASSISTANT

## 2018-04-21 PROCEDURE — 84165 PROTEIN E-PHORESIS SERUM: CPT | Performed by: PATHOLOGY

## 2018-04-21 PROCEDURE — G8987 SELF CARE CURRENT STATUS: HCPCS

## 2018-04-21 RX ORDER — HEPARIN SODIUM 1000 [USP'U]/ML
2000 INJECTION, SOLUTION INTRAVENOUS; SUBCUTANEOUS AS NEEDED
Status: DISCONTINUED | OUTPATIENT
Start: 2018-04-21 | End: 2018-04-28

## 2018-04-21 RX ADMIN — AMLODIPINE BESYLATE 10 MG: 10 TABLET ORAL at 08:20

## 2018-04-21 RX ADMIN — VITAMIN D, TAB 1000IU (100/BT) 1000 UNITS: 25 TAB at 08:20

## 2018-04-21 RX ADMIN — CINACALCET HYDROCHLORIDE 30 MG: 30 TABLET, COATED ORAL at 08:20

## 2018-04-21 RX ADMIN — PRAVASTATIN SODIUM 40 MG: 40 TABLET ORAL at 16:30

## 2018-04-21 RX ADMIN — HEPARIN SODIUM 5000 UNITS: 5000 INJECTION, SOLUTION INTRAVENOUS; SUBCUTANEOUS at 21:31

## 2018-04-21 RX ADMIN — ONDANSETRON 4 MG: 2 INJECTION INTRAMUSCULAR; INTRAVENOUS at 08:24

## 2018-04-21 RX ADMIN — RENAGEL 800 MG: 800 TABLET ORAL at 08:20

## 2018-04-21 RX ADMIN — RENAGEL 800 MG: 800 TABLET ORAL at 16:30

## 2018-04-21 RX ADMIN — RENAGEL 800 MG: 800 TABLET ORAL at 12:24

## 2018-04-21 RX ADMIN — PANTOPRAZOLE SODIUM 40 MG: 40 TABLET, DELAYED RELEASE ORAL at 06:04

## 2018-04-21 RX ADMIN — HEPARIN SODIUM 5000 UNITS: 5000 INJECTION, SOLUTION INTRAVENOUS; SUBCUTANEOUS at 06:05

## 2018-04-21 NOTE — ASSESSMENT & PLAN NOTE
· Troponin mildly elevated- no EKG changes or chest pain  · Trend is flat, likely related to end-stage renal disease  · Patient has no symptoms of chest pain, shortness of breath or any other angina equivalents at this time  · Possible component related to type 2 secondary to severe anemia

## 2018-04-21 NOTE — PLAN OF CARE
Problem: OCCUPATIONAL THERAPY ADULT  Goal: Performs self-care activities at highest level of function for planned discharge setting  See evaluation for individualized goals  Treatment Interventions: ADL retraining, Functional transfer training, UE strengthening/ROM, Endurance training, Patient/family training, Equipment evaluation/education, Compensatory technique education, Fine motor coordination activities, Continued evaluation, Energy conservation, Activityengagement          See flowsheet documentation for full assessment, interventions and recommendations  Limitation: Decreased ADL status, Decreased UE strength, Decreased Safe judgement during ADL, Decreased endurance, Decreased self-care trans, Decreased high-level ADLs, Decreased fine motor control  Prognosis: Fair  Assessment: Pt is a 73 y/o female seen for OT eval s/p adm to [facility] w/ increased weakness and fatigue  Pt has a history of ESRD secondary to polycystic kidney disease and anemia Comorbidities include a h/o CKD, dyslipidemia, A-fib, end stage renal disease, HTN, HLD, weight loss  Pt with active OT orders and up w/ A  Pt lives alone in senior housing apartment w/ no DENA   Pt requires assist w/  ADLS and IADLS, does not drive, & required use of DME PTA including a SPC  Pt is currently demonstrating the following occupational deficits: Min A UB ADLS, Mod A LB ADLS, Min A transfers and functional mobility w/ hand held assist/SPC  Pt with deficits and limitations in all baseline areas of occupation 2* fatigue, nausea, decreased strength and endurance, generalized weakness and deconditioning, decreased supportive home environment, decreased functional mobility and activity tolerance, decreased I w/ ADLS and IADLS compared to previous level of functioning   The following Occupational Performance Areas to address include: eating, grooming, bathing/shower, toilet hygiene, dressing, medication management, socialization, health maintenance, functional mobility, community mobility, clothing management, meal prep and social participation  Pt scored overall 50/100 on the Barthel Index  Based on the aforementioned OT evaluation, functional performance deficits, and assessments, pt has been identified as a moderate complexity evaluation  Recommend pt d/c to STR when medically stable    Pt to continue to benefit from acute immediate OT services to address the following goals 3-5x/wk to  w/in 7-10 days:     OT Discharge Recommendation: Short Term Rehab  OT - OK to Discharge: Yes (when medically stable)      Comments: Rocio MUNIZ, OTR/L

## 2018-04-21 NOTE — ASSESSMENT & PLAN NOTE
· Acute on chronic anemia, possibly related to kidney disease versus other causes  · Earlier this year underwent EGD/colonscopy at [facility]: Colonoscopy with large internal and external hemorrhoids and small non-bleeding rectal ulcer, EGD with antral gastritis, non erosive duodenitis and hiatal hernia  · Hematology evaluated the patient, sent iron panel, I have added erythropoietin level  · CT chest abdomen and pelvis negative for malignant  · Component of very poor nutritional status  · Consult Gastroenterology to inquire if will be worthwhile to repeat endoscopy at this point  · She is currently status post transfusion of 1 unit packed red blood cells in dialysis today  · Of note, thrombocytopenia noted, Okay to continue with heparin for DVT prophylaxis at this time, as long as platelets above 50

## 2018-04-21 NOTE — ASSESSMENT & PLAN NOTE
· Multifactorial, likely related to very poor nutritional status with weight loss as well as symptomatic anemia  · PT and OT to evaluate patient  · Treat underlying causes

## 2018-04-21 NOTE — CONSULTS
Oncology Consult Note  Carrington Ruby 74 y o  female MRN: [id number]  Unit/Bed#: [facility] 808-01 Encounter: [id number]      Presenting Complaint:  Anemia and weight loss  History of Presenting Illness:  A 75-year-old female with history of polycystic kidney  As a result, she has end-stage renal disease  She has been on hemodialysis for last 5 years  She was hospitalized with progressive weakness as well as weight loss  According to the chart, she has lost 20 lb  She has no complaint of pain  She was smoker for 40 years until 2009  However, she has no respiratory symptoms such as cough, sputum production or shortness of breast   She feels quite weak  We do not have any prior iron study, F84 or folic acid  Her hemoglobin is below 8  She has mild thrombocytopenia with 100,000 of platelet count  Her WBC is normal  CMP showed normal level of total protein and severely decreased albumin level, suggestion malnutrition and possibly increased gammaglobulin  She has no complaint of bone pain  Review of Systems - As stated in the HPI otherwise the fourteen point review of systems was negative  Past Medical History:   Diagnosis Date    Chronic kidney disease     Dyslipidemia        Social History     Social History    Marital status:      Spouse name: N/A    Number of children: N/A    Years of education: N/A     Social History Main Topics    Smoking status: Never Smoker    Smokeless tobacco: Never Used    Alcohol use No    Drug use: No    Sexual activity: Not Asked     Other Topics Concern    None     Social History Narrative    None       History reviewed  No pertinent family history      No Known Allergies      Current Facility-Administered Medications:     acetaminophen (TYLENOL) tablet 650 mg, 650 mg, Oral, Q6H PRN, Johann Caro PA-C    amLODIPine (NORVASC) tablet 10 mg, 10 mg, Oral, Daily, Johann Caro PA-C, 10 mg at 04/21/18 0820    calcium carbonate (TUMS) chewable tablet 1,000 mg, 1,000 mg, Oral, Daily PRN, ROSA Blanco-C    cholecalciferol (VITAMIN D3) tablet 1,000 Units, 1,000 Units, Oral, Daily, ROSA Blanco-C, 1,000 Units at 04/21/18 0820    cinacalcet (SENSIPAR) tablet 30 mg, 30 mg, Oral, Daily With Breakfast, ROSA Blanco-C, 30 mg at 04/21/18 0820    heparin (porcine) subcutaneous injection 5,000 Units, 5,000 Units, Subcutaneous, Q8H [address], 5,000 Units at 04/21/18 0605 **AND** Platelet count, , , Once, ROSA Blanco-CLAUDIA    ondansetron [facility]) injection 4 mg, 4 mg, Intravenous, Q6H PRN, ROSA Blanco-C, 4 mg at 04/21/18 0824    pantoprazole (PROTONIX) EC tablet 40 mg, 40 mg, Oral, Early Morning, Yoana Paula PA-C, 40 mg at 04/21/18 0604    polyethylene glycol (MIRALAX) packet 17 g, 17 g, Oral, Daily PRN, Yoana Paula PA-C    pravastatin (PRAVACHOL) tablet 40 mg, 40 mg, Oral, Daily With [employer], PA-C, 40 mg at 04/20/18 1836    sevelamer (RENAGEL) tablet 800 mg, 800 mg, Oral, TID With Meals, ROSA Blanco-C, 800 mg at 04/21/18 0820      /53 (BP Location: Right arm)   Pulse 93   Temp 98 9 °F (37 2 °C) (Oral)   Resp 16   Ht 5' 5 5" (1 664 m)   Wt 43 3 kg (95 lb 8 oz)   SpO2 93%   BMI 15 65 kg/m²     General Appearance:    Alert, oriented , she appeared to be cachectic  Eyes:    PERRL   Ears:    Normal external ear canals, both ears   Nose:   Nares normal, septum midline   Throat:   Mucosa moist  Pharynx without injection  Neck:   Supple       Lungs:     Clear to auscultation bilaterally   Chest Wall:    No tenderness or deformity    Heart:    Regular rate and rhythm       Abdomen:     Mild tenderness in epigastrium  Large kidney are palpable bilaterally  Mild abdominal distention  Extremities:   Extremities no cyanosis or edema       Skin:   no rash or icterus      Lymph nodes:   Cervical, supraclavicular, and axillary nodes normal   Neurologic:   CNII-XII intact, normal strength, sensation and reflexes     Throughout               Recent Results (from the past 48 hour(s))   Type and screen    Collection Time: 04/19/18  1:18 PM   Result Value Ref Range    ABO Grouping O     Rh Factor Negative     Antibody Screen Negative     Specimen Expiration Date [id number]    CBC and differential    Collection Time: 04/20/18  2:53 PM   Result Value Ref Range    WBC 6 80 4 31 - 10 16 Thousand/uL    RBC 2 67 (L) 3 81 - 5 12 Million/uL    Hemoglobin 7 6 (L) 11 5 - 15 4 g/dL    Hematocrit 24 3 (L) 34 8 - 46 1 %    MCV 91 82 - 98 fL    MCH 28 5 26 8 - 34 3 pg    MCHC 31 3 (L) 31 4 - 37 4 g/dL    RDW 17 8 (H) 11 6 - 15 1 %    MPV 12 2 8 9 - 12 7 fL    Platelets 581 (L) 315 - 390 Thousands/uL    nRBC 0 /100 WBCs   Comprehensive metabolic panel    Collection Time: 04/20/18  2:53 PM   Result Value Ref Range    Sodium 135 (L) 136 - 145 mmol/L    Potassium 4 1 3 5 - 5 3 mmol/L    Chloride 99 (L) 100 - 108 mmol/L    CO2 28 21 - 32 mmol/L    Anion Gap 8 4 - 13 mmol/L    BUN 20 5 - 25 mg/dL    Creatinine 2 93 (H) 0 60 - 1 30 mg/dL    Glucose 102 65 - 140 mg/dL    Calcium 9 1 8 3 - 10 1 mg/dL    AST 18 5 - 45 U/L    ALT 7 (L) 12 - 78 U/L    Alkaline Phosphatase 121 (H) 46 - 116 U/L    Total Protein 6 7 6 4 - 8 2 g/dL    Albumin 1 8 (L) 3 5 - 5 0 g/dL    Total Bilirubin 1 18 (H) 0 20 - 1 00 mg/dL    eGFR 15 ml/min/1 73sq m   Troponin I    Collection Time: 04/20/18  2:53 PM   Result Value Ref Range    Troponin I 0 08 (H) <=0 04 ng/mL   Manual Differential(PHLEBS Do Not Order)    Collection Time: 04/20/18  2:53 PM   Result Value Ref Range    Segmented % 93 (H) 43 - 75 %    Lymphocytes % 5 (L) 14 - 44 %    Monocytes % 2 (L) 4 - 12 %    Eosinophils % 0 0 - 6 %    Basophils % 0 0 - 1 %    Absolute Neutrophils 6 32 1 85 - 7 62 Thousand/uL    Lymphocytes Absolute 0 34 (L) 0 60 - 4 47 Thousand/uL    Monocytes Absolute 0 14 0 00 - 1 22 Thousand/uL    Eosinophils Absolute 0 00 0 00 - 0 40 Thousand/uL Basophils Absolute 0 00 0 00 - 0 10 Thousand/uL    Total Counted      RBC Morphology Present     Ovalocytes Present     Poikilocytes Present     Platelet Estimate Decreased (A) Adequate   Troponin I    Collection Time: 04/20/18  7:08 PM   Result Value Ref Range    Troponin I 0 09 (H) <=0 04 ng/mL   Prepare RBC:Has consent been obtained? Yes, 1 Units    Collection Time: 04/20/18  8:11 PM   Result Value Ref Range    Unit Product Code [id number]     Unit Number [id number]     Unit ABO O     Unit RH NEG     Crossmatch Compatible     Unit Dispense Status Crossmatched    Troponin I    Collection Time: 04/20/18 11:31 PM   Result Value Ref Range    Troponin I 0 09 (H) <=0 04 ng/mL   TSH, 3rd generation    Collection Time: 04/21/18  5:11 AM   Result Value Ref Range    TSH 3RD GENERATON 3 130 0 358 - 3 740 uIU/mL         Xr Chest 2 Views    Result Date: 4/20/2018  Narrative: CHEST INDICATION:   weakness  COMPARISON:  None EXAM PERFORMED/VIEWS:  XR CHEST PA & LATERAL FINDINGS: Heart shadow appears unremarkable  Atherosclerotic vascular calcifications are noted  The lungs are clear  No pneumothorax or pleural effusion  Osseous structures appear within normal limits for patient age  Impression: No acute cardiopulmonary disease  Workstation performed: [id number]       Assessment:  Anemia and mild thrombocytopenia  20 lb weight loss  Ex-smoker  Plan:  A 78-year-old female with history as described above  Regarding her anemia, I would obtain iron study, Z30 and folic acid  Since she has relatively wide gap between total protein and albumin, I think it is reasonable to test SPEP to rule out monoclonal protein  She was smoker for 40 years  Although, chest x-ray she is was negative, I think it is reasonable to obtain CT scan of chest abdomen pelvis without IV contrast to rule out malignancy, since she has 20 lb weight loss  She is in agreement with my recommendations

## 2018-04-21 NOTE — PROGRESS NOTES
Pt refusing to wear sequential compression devices for legs, but aggreeable to take subcutaneous heparin  Dr Domonique Freeman made aware  No new orders

## 2018-04-21 NOTE — ASSESSMENT & PLAN NOTE
· Discussed with daughter, patient with very sudden weight loss of about 20 lb in the last 3 months, at this time patient seems refusing to eat most of her meals  · Daughter voices concerns that mother might have underlying depression versus having lost the will of continue with dialysis and treat her multiple medical problems  · Discussed with patient, patient cannot give me and explanation of why her p o  intake support, other than she does not feel like eating most of the time  · She is open to try nutritional supplements  · I have consulted palliative care for non pain symptoms management including anorexia, anxiety with trichotillomania, and also clarification of goal of care for the patient  · If patient p o  does not improve, will need to approach her for possible supplemental feeding via PEG tube

## 2018-04-21 NOTE — OCCUPATIONAL THERAPY NOTE
[address]  Evaluation     Patient Name: Primo Benítez  AOGLZ'B Date: 4/21/2018  Problem List  Patient Active Problem List   Diagnosis    [facility] [facility]    End stage renal disease ([facility] [facility] )    Hemodialysis patient ([facility] [facility] )    Hypertension    Polycystic kidney    Hyperlipidemia    Anemia, normocytic normochromic    Weakness    Weight loss    Symptomatic anemia    Elevated troponin     Past Medical History  Past Medical History:   Diagnosis Date    Chronic kidney disease     Dyslipidemia      Past Surgical History  History reviewed  No pertinent surgical history  04/21/18 0835   Note Type   Note type Eval/Treat   Restrictions/Precautions   Weight Bearing Precautions Per Order No   Other Precautions Fall Risk;Pain   Pain Assessment   Pain Assessment No/denies pain   Pain Score No Pain   Home Living   Type of Home Apartment   Home Layout One level; Access   Bathroom Shower/Tub Tub/shower unit   [facility] Grab bars around toilet;Grab bars in [address]   Additional Comments Pt reports living in 1 story senior apartment w/ no DENA   Prior Function   Level of [place] Needs assistance with IADLs; Needs assistance with ADLs and functional mobility   Lives With Alone   Receives Help From Family  (son and daughter)   ADL Assistance Needs assistance   IADLs Needs assistance   Vocational Retired   Comments Pt reports requiring assist for ADLS (son/dght helps w/ bathing) pt reports being able to dress independently  Requires assist for IADLS, and is Mod I for transfers and functional mobility w/ [facility]   Lifestyle   Autonomy Pt reports requiring assist for ADLS (son/dght helps w/ bathing) pt reports being able to dress independently   Requires assist for IADLS, and is Mod I for transfers and functional mobility w/ [facility]   Reciprocal Relationships Pt lives alone; has son and dght hek   Service to Others Pt is retired   Intrinsic Gratification Pt reports enjoying walking   Psychosocial   Psychosocial (WDL) WDL   ADL   Eating Assistance 5  Supervision/Setup   Grooming Assistance 5  Supervision/Setup   UB Bathing Assistance 4  Minimal Assistance   LB Bathing Assistance 3  Moderate Assistance   UB Dressing Assistance 4  Minimal [address] 3  Moderate [address]  5  Supervision/Setup   Functional Assistance 4  Minimal Assistance   Bed Mobility   Rolling L 5  Supervision   Additional items HOB elevated; Bedrails; Increased time required   Supine to Sit 5  Supervision   Additional items Increased time required;Verbal cues;HOB elevated; Bedrails   Sit to Supine Unable to assess   Additional Comments Pt went from supine to sit w/ S and HOB elevated w/ use of bed rails and increased time  Pt able to roll L and then prop self up into sitting  Able to maintain sitting EOB balance w/ S for safety  Transfers   Sit to Stand 4  Minimal assistance   Additional items Assist x 1; Increased time required;Verbal cues   Stand to Sit 4  Minimal assistance   Additional items Assist x 1; Increased time required;Verbal cues   Stand pivot 4  Minimal assistance   Additional items Assist x 1; Increased time required;Verbal cues   Additional Comments Pt performed sit-stand from EOB w/ Min A x1 for safety and balance and mild force production into standing   Then performed SPT from bed to chair and stand-sit to chair all w/ hand held assist     Functional Mobility   Functional Mobility 4  Minimal assistance   Additional Comments Pt took few small steps in room w/ Min A x1, used hand held assist but usually ambulated w/ [facility]   Balance   Static Sitting Fair +   Dynamic Sitting Fair -   Static Standing Poor +   Ambulatory Poor +   Activity Tolerance   Activity Tolerance Patient limited by fatigue   Nurse Made Aware yes- present in room to give meds   RUE Assessment   RUE Assessment WFL   LUE Assessment   LUE Assessment WFL   Hand Function Gross Motor Coordination Functional   Fine Motor Coordination Functional   Cognition   Overall Cognitive Status WFL   Arousal/Participation Alert; Responsive; Cooperative   Attention Within functional limits   Orientation Level Oriented X4   Memory Within functional limits   Following Commands Follows one step commands without difficulty   Comments Pt is pleasant and cooperative   Assessment   Limitation Decreased ADL status; Decreased UE strength;Decreased Safe judgement during ADL;Decreased endurance;Decreased self-care trans;Decreased high-level ADLs; Decreased fine motor control   Prognosis Fair   Assessment Pt is a 73 y/o female seen for OT eval s/p adm to [facility] w/ increased weakness and fatigue  Pt has a history of ESRD secondary to polycystic kidney disease and anemia Comorbidities include a h/o CKD, dyslipidemia, A-fib, end stage renal disease, HTN, HLD, weight loss  Pt with active OT orders and up w/ A  Pt lives alone in senior housing apartment w/ no DENA   Pt requires assist w/  ADLS and IADLS, does not drive, & required use of DME PTA including a SPC  Pt is currently demonstrating the following occupational deficits: Min A UB ADLS, Mod A LB ADLS, Min A transfers and functional mobility w/ hand held assist/SPC  Pt with deficits and limitations in all baseline areas of occupation 2* fatigue, nausea, decreased strength and endurance, generalized weakness and deconditioning, decreased supportive home environment, decreased functional mobility and activity tolerance, decreased I w/ ADLS and IADLS compared to previous level of functioning  The following Occupational Performance Areas to address include: eating, grooming, bathing/shower, toilet hygiene, dressing, medication management, socialization, health maintenance, functional mobility, community mobility, clothing management, meal prep and social participation  Pt scored overall 50/100 on the Barthel Index   Based on the aforementioned OT evaluation, functional performance deficits, and assessments, pt has been identified as a moderate complexity evaluation  Recommend pt d/c to STR when medically stable   Pt to continue to benefit from acute immediate OT services to address the following goals 3-5x/wk to  w/in 7-10 days:   Goals   Patient Goals to go to rehab   LTG Time Frame 7-10   Long Term Goal #1 see below listed goals   Plan   Treatment Interventions ADL retraining;Functional transfer training;UE strengthening/ROM; Endurance training;Patient/family training;Equipment evaluation/education; Compensatory technique education; Fine motor coordination activities;Continued evaluation; Energy conservation; Activityengagement   Goal Expiration Date 18   OT Frequency 3-5x/wk   Recommendation   OT Discharge Recommendation Short Term Rehab   OT - OK to Discharge Yes  (when medically stable)   Barthel Index   Feeding 5   Bathing 0   Grooming Score 5   Dressing Score 5   Bladder Score 10   Bowels Score 10   Toilet Use Score 5   Transfers (Bed/Chair) Score 10   Mobility (Level Surface) Score 0   Stairs Score 0   Barthel Index Score 50   Modified [place] Scale   Modified [place] Scale 4        GOALS    1) Pt will improve activity tolerance to G for min 30 min txment sessions    2) Pt will complete UB/LB dressing/self care w/ S using adaptive device and DME as needed    3) Pt will complete bathing w/ S w/ use of AE and DME as needed    4) Pt will complete toileting w/ S w/ G hygiene/thoroughness using DME as needed    5) Pt will improve functional transfers to S on/off all surfaces using DME as needed w/ G balance/safety     6) Pt will improve functional mobility during ADL/IADL/leisure tasks to S using DME as needed w/ G balance/safety     7) Pt will engage in ongoing cognitive assessment w/ G participation w/ S to assist w/ safe d/c planning/recommendations    8) Pt will demonstrate G carryover of pt/caregiver education and training as appropriate w/ S w/o cues w/ good tolerance    9) Pt will demonstrate 100% carryover of energy conservation techniques w/ S t/o functional I/ADL/leisure tasks w/o cues s/p skilled education    10) Pt will tolerate PROM/AROM/AAROM in all planes w/ S w/ G participation to improve fx'l UB use as prerequisite for I/ADL/leisure    [facility] MOT, OTR/L

## 2018-04-21 NOTE — PROGRESS NOTES
Progress Note - Alexys Gan 1945, 74 y o  female MRN: [id number]    Unit/Bed#: [facility] 808-01 Encounter: [id number]    Primary Care Provider: Suyapa Barkley MD   Date and time admitted to hospital: 4/20/2018  1:40 PM        Elevated troponin   Assessment & Plan    · Troponin mildly elevated- no EKG changes or chest pain  · Trend is flat, likely related to end-stage renal disease  · Patient has no symptoms of chest pain, shortness of breath or any other angina equivalents at this time  · Possible component related to type 2 secondary to severe anemia        Symptomatic anemia   Assessment & Plan    · Acute on chronic anemia, possibly related to kidney disease versus other causes  · Earlier this year underwent EGD/colonscopy at [facility]: Colonoscopy with large internal and external hemorrhoids and small non-bleeding rectal ulcer, EGD with antral gastritis, non erosive duodenitis and hiatal hernia  · Hematology evaluated the patient, sent iron panel, I have added erythropoietin level  · CT chest abdomen and pelvis negative for malignant  · Component of very poor nutritional status  · Consult Gastroenterology to inquire if will be worthwhile to repeat endoscopy at this point  · She is currently status post transfusion of 1 unit packed red blood cells in dialysis today  · Of note, thrombocytopenia noted, Okay to continue with heparin for DVT prophylaxis at this time, as long as platelets above 50        Weight loss   Assessment & Plan    · Discussed with daughter, patient with very sudden weight loss of about 20 lb in the last 3 months, at this time patient seems refusing to eat most of her meals  · Daughter voices concerns that mother might have underlying depression versus having lost the will of continue with dialysis and treat her multiple medical problems  · Discussed with patient, patient cannot give me and explanation of why her p o  intake support, other than she does not feel like eating most of the time  · She is open to try nutritional supplements  · I have consulted palliative care for non pain symptoms management including anorexia, anxiety with trichotillomania, and also clarification of goal of care for the patient  · If patient p o  does not improve, will need to approach her for possible supplemental feeding via PEG tube          End stage renal disease ([facility] [address] )   Assessment & Plan    · Dialysis MWF  · Nauseous during dialysis, limited ultrafiltration secondary to hypotension, Nephrology has discontinue calcium channel blocker for the patient  · Continue with dialysis as per Nephrology recommendation, last episode today        * Weakness   Assessment & Plan    · Multifactorial, likely related to very poor nutritional status with weight loss as well as symptomatic anemia  · PT and OT to evaluate patient  · Treat underlying causes          VTE Pharmacologic Prophylaxis: yes  Pharmacologic: Heparin  Mechanical VTE Prophylaxis in Place: Yes    Patient Centered Rounds: I have performed bedside rounds with nursing staff today  Discussions with Specialists or Other Care Team Provider:  Oncology    Education and Discussions with Family / Patient:  Patient, daughter    Time Spent for Care: 45 minutes  More than 50% of total time spent on counseling and coordination of care as described above  Current Length of Stay: 1 day(s)    Current Patient Status: Inpatient   Certification Statement: The patient will continue to require additional inpatient hospital stay due to Refer to above    Discharge Plan: To be determined although patient would like to speak with  to go to nursing home    Code Status: Level 1 - Full Code      Subjective:   Patient has no complaints other than mild nausea while on dialysis      Objective:     Vitals:   Temp (24hrs), Av 7 °F (37 1 °C), Min:97 6 °F (36 4 °C), Max:99 6 °F (37 6 °C)    HR:  [75-94] 82  Resp:  [12-18] 12  BP: ()/(25-53) 105/36  SpO2:  [93 %-100 %] 93 %  Body mass index is 15 65 kg/m²  Input and Output Summary (last 24 hours): Intake/Output Summary (Last 24 hours) at 04/21/18 1617  Last data filed at 04/21/18 1530   Gross per 24 hour   Intake             1090 ml   Output              502 ml   Net              588 ml       Physical Exam:     Physical Exam   Constitutional: She is oriented to person, place, and time  Cachectic   Cardiovascular: Normal rate, regular rhythm and normal heart sounds  Exam reveals no friction rub  No murmur heard  Pulmonary/Chest: Effort normal  No respiratory distress  She has no wheezes  She has no rales  Abdominal: Soft  She exhibits no distension  There is no tenderness  There is no rebound  Musculoskeletal: She exhibits no edema  Neurological: She is alert and oriented to person, place, and time  She exhibits normal muscle tone  Skin: Skin is warm  Psychiatric: She has a normal mood and affect  Additional Data:     Labs:      Results from last 7 days  Lab Units 04/21/18  1348 04/20/18  1453   WBC Thousand/uL  --  6 80   HEMOGLOBIN g/dL  --  7 6*   HEMATOCRIT %  --  24 3*   PLATELETS Thousands/uL 72* 102*   LYMPHO PCT %  --  5*   MONO PCT MAN %  --  2*   EOSINO PCT MANUAL %  --  0       Results from last 7 days  Lab Units 04/20/18  1453   SODIUM mmol/L 135*   POTASSIUM mmol/L 4 1   CHLORIDE mmol/L 99*   CO2 mmol/L 28   BUN mg/dL 20   CREATININE mg/dL 2 93*   CALCIUM mg/dL 9 1   TOTAL PROTEIN g/dL 6 7   BILIRUBIN TOTAL mg/dL 1 18*   ALK PHOS U/L 121*   ALT U/L 7*   AST U/L 18   GLUCOSE RANDOM mg/dL 102           * I Have Reviewed All Lab Data Listed Above  * Additional Pertinent Lab Tests Reviewed:  All Labs Within Last 24 Hours Reviewed    Imaging:    Imaging Reports Reviewed Today Include:  None  Imaging Personally Reviewed by Myself Includes:  None    Recent Cultures (last 7 days):           Last 24 Hours Medication List:     Current Facility-Administered Medications:  acetaminophen 650 mg Oral Q6H PRN Yoana Paula PA-C   calcium carbonate 1,000 mg Oral Daily PRN Yoana Paula PA-C   cholecalciferol 1,000 Units Oral Daily [place] JACY Paula   cinacalcet 30 mg Oral Daily With Breakfast Yoana Paula PA-C   [START ON 4/23/2018] epoetin beth 4,000 Units Intravenous After Dialysis Sveta Mayen, DO   And       [START ON 4/23/2018] epoetin beth 3,000 Units Intravenous After Dialysis Sveta Mayen DO   heparin (porcine) 2,000 Units Intravenous PRN [facility],    heparin (porcine) 5,000 Units Subcutaneous Q8H [facility] Yoana Paula PA-C   ondansetron 4 mg Intravenous Q6H PRN Midbradley Paula PA-C   pantoprazole 40 mg Oral Early Morning Midbradley Paula PA-C   polyethylene glycol 17 g Oral Daily PRN Midbradley Paula PA-C   pravastatin 40 mg Oral Daily With [employer]AC[employer]   sevelamer 800 mg Oral TID With Meals Yoana Paula PA-C        Today, Patient Was Seen By: Wandy John MD    ** Please Note: Dragon 360 Dictation voice to text software may have been used in the creation of this document   **

## 2018-04-21 NOTE — ASSESSMENT & PLAN NOTE
· Dialysis MWF  · Nauseous during dialysis, limited ultrafiltration secondary to hypotension, Nephrology has discontinue calcium channel blocker for the patient  · Continue with dialysis as per Nephrology recommendation, last episode today

## 2018-04-21 NOTE — CONSULTS
Consultation - Nephrology   Shelton Dorsey 74 y o  female MRN: [id number]  [facility] Encounter: [id number]      Assessment/Plan:  1  End-stage renal disease, maintenance hemodialysis Monday Wednesday Friday, Ondina Mclaughlin  2  Acute on chronic anemia, status post PRBC transfusion, continue with Epogen  3  Generalized weakness fatigue  4  Failure to thrive  5  Discrepant total protein and albumin, serum electrophoresis pending  6  Hypotension, discontinue calcium channel blocker  7  Mineral bone disorder, continue with Sensipar    Plan:  · CT scan of the chest abdomen pelvis without evidence of malignancy  · 1 unit PRBC with hemodialysis today  · Limited ultrafiltration given patient's blood pressure  · Discontinue calcium channel blocker  · Hematology oncology following    History of Present Illness   Physician Requesting Consult: Shavonne Hinds MD  Reason for Consult / Principal Problem:  End-stage renal disease  HPI: Shelton Dorsey is a 74y o  year old female who presents with weakness and fatigue  Patient is a 64-year-old female with known history of polycystic kidney disease, end-stage renal disease receiving hemodialysis Monday Wednesday Friday  During dialysis yesterday patient's blood pressure was noted be in the 80s and was sent for further evaluation  Patient does report near 20 lb weight loss  Appetite has been poor  Again weakness and fatigue  No significant chest pain or shortness of breath  Denies any lower extremity swelling  No abdominal pain mild abdominal distention    History obtained from chart review and the patient    Review of Systems    Review of Systems: pertinent findings as noted above otherwise negative    Historical Information   Patient Active Problem List   Diagnosis    Atrial fibrillation [facility]    End stage renal disease [facility] [address] [facility]    Hemodialysis patient ([facility] [address] [facility]    Hypertension    Polycystic kidney    Hyperlipidemia    Anemia, normocytic normochromic    Weakness    Weight loss    Symptomatic anemia    Elevated troponin     Past Medical History:   Diagnosis Date    Chronic kidney disease     Dyslipidemia      History reviewed  No pertinent surgical history  Social History   History   Alcohol Use No     History   Drug Use No     History   Smoking Status    Never Smoker   Smokeless Tobacco    Never Used     History reviewed  No pertinent family history      Meds/Allergies   current meds:   Current Facility-Administered Medications   Medication Dose Route Frequency    acetaminophen (TYLENOL) tablet 650 mg  650 mg Oral Q6H PRN    calcium carbonate (TUMS) chewable tablet 1,000 mg  1,000 mg Oral Daily PRN    cholecalciferol (VITAMIN D3) tablet 1,000 Units  1,000 Units Oral Daily    cinacalcet (SENSIPAR) tablet 30 mg  30 mg Oral Daily With Breakfast    [START ON 4/23/2018] epoetin beth (EPOGEN,PROCRIT) injection 4,000 Units  4,000 Units Intravenous After Dialysis    And    [START ON 4/23/2018] epoetin beth (EPOGEN,PROCRIT) injection 3,000 Units  3,000 Units Intravenous After Dialysis    heparin (porcine) injection 2,000 Units  2,000 Units Intravenous PRN    heparin (porcine) subcutaneous injection 5,000 Units  5,000 Units Subcutaneous Q8H [facility]    ondansetron (ZOFRAN) injection 4 mg  4 mg Intravenous Q6H PRN    pantoprazole (PROTONIX) EC tablet 40 mg  40 mg Oral Early Morning    polyethylene glycol (MIRALAX) packet 17 g  17 g Oral Daily PRN    pravastatin (PRAVACHOL) tablet 40 mg  40 mg Oral Daily With Dinner    sevelamer (RENAGEL) tablet 800 mg  800 mg Oral TID With Meals       No Known Allergies      Objective   BP (!) 93/25   Pulse 80   Temp 97 6 °F (36 4 °C)   Resp 12   Ht 5' 5 5" (1 664 m)   Wt 43 3 kg (95 lb 8 oz)   SpO2 93%   BMI 15 65 kg/m²     Intake/Output Summary (Last 24 hours) at 04/21/18 1502  Last data filed at 04/21/18 1453   Gross per 24 hour   Intake              790 ml   Output                0 ml   Net              790 ml Current Weight: Weight - Scale: 43 3 kg (95 lb 8 oz)    Physical Exam   Constitutional: She is oriented to person, place, and time  She appears cachectic  HENT:   Head: Normocephalic  Eyes: Conjunctivae are normal    Neck: Neck supple  Cardiovascular: Normal rate and regular rhythm  Pulmonary/Chest: Effort normal and breath sounds normal    Abdominal: Soft  Bowel sounds are normal  There is no tenderness  Musculoskeletal: She exhibits no edema  Neurological: She is alert and oriented to person, place, and time  Skin: Skin is warm and dry  Psychiatric: She has a normal mood and affect           Lab Results:      Results from last 7 days  Lab Units 04/21/18  1348 04/20/18  1453   WBC Thousand/uL  --  6 80   HEMOGLOBIN g/dL  --  7 6*   HEMATOCRIT %  --  24 3*   PLATELETS Thousands/uL 72* 102*       Results from last 7 days  Lab Units 04/20/18  1453   SODIUM mmol/L 135*   POTASSIUM mmol/L 4 1   CHLORIDE mmol/L 99*   CO2 mmol/L 28   BUN mg/dL 20   CREATININE mg/dL 2 93*   GLUCOSE RANDOM mg/dL 102   CALCIUM mg/dL 9 1

## 2018-04-21 NOTE — PLAN OF CARE
Problem: Nutrition/Hydration-ADULT  Goal: Nutrient/Hydration intake appropriate for improving, restoring or maintaining nutritional needs  Monitor and assess patient's nutrition/hydration status for malnutrition (ex- brittle hair, bruises, dry skin, pale skin and conjunctiva, muscle wasting, smooth red tongue, and disorientation)  Collaborate with interdisciplinary team and initiate plan and interventions as ordered  Monitor patient's weight and dietary intake as ordered or per policy  Utilize nutrition screening tool and intervene per policy  Determine patient's food preferences and provide high-protein, high-caloric foods as appropriate  INTERVENTIONS:  - Monitor oral intake, urinary output, labs, and treatment plans  - Assess nutrition and hydration status and recommend course of action  - Evaluate amount of meals eaten  - Assist patient with eating if necessary   - Allow adequate time for meals  - Recommend/ encourage appropriate diets, oral nutritional supplements, and vitamin/mineral supplements  - Order, calculate, and assess calorie counts as needed  - Recommend, monitor, and adjust tube feedings and TPN/PPN based on assessed needs  - Assess need for intravenous fluids  - Provide specific nutrition/hydration education as appropriate  - Include patient/family/caregiver in decisions related to nutrition    Outcome: Not Progressing  Needs much encouragement to eat- continue to offer Ensure enlive TID   Consider Appetite enhancer

## 2018-04-21 NOTE — SOCIAL WORK
CM met with patient and daughter Aron Nova ([phone]) at bedside  Pt resides alone in a 1st floor apartment at [address] [place] [address]  No DENA  Prior to admission patient was independent with ADLs although Pt's daughter states that she has become increasing weak within the last 3 - 4 months  Pt's daughter reports that Pt hospitalized 2x in 3 months at [facility] and refused [address] service  Pt uses a can to ambulate  No other DME  Patient receives dialysis at [facility]  Bus transports patient to from dialysis  Family transports to other appointments  Pt reports medications are supplied from [facility]  No history of HHC or STR  No POA  Pt's daughter Aron Nova and son Jazlyn La ([phone]) are both involved and Pt is agreeable to both children's involvement in decision making  Pt and family are interested in a referral to [place] if PT/OT recommend rehab  No history of MH or D/A treatment  CM reviewed d/c planning process including the following: identifying help at home, patient preference for d/c planning needs, Discharge Lounge, [facility] Meds to Bed program, availability of treatment team to discuss questions or concerns patient and/or family may have regarding understanding medications and recognizing signs and symptoms once discharged  CM also encouraged patient to follow up with all recommended appointments after discharge  Patient advised of importance for patient and family to participate in managing patients medical well being

## 2018-04-22 LAB
ABO GROUP BLD BPU: NORMAL
ANION GAP SERPL CALCULATED.3IONS-SCNC: 8 MMOL/L (ref 4–13)
BASOPHILS # BLD AUTO: 0.01 THOUSANDS/ΜL (ref 0–0.1)
BASOPHILS NFR BLD AUTO: 0 % (ref 0–1)
BPU ID: NORMAL
BUN SERPL-MCNC: 20 MG/DL (ref 5–25)
CALCIUM SERPL-MCNC: 9 MG/DL (ref 8.3–10.1)
CHLORIDE SERPL-SCNC: 100 MMOL/L (ref 100–108)
CO2 SERPL-SCNC: 28 MMOL/L (ref 21–32)
CREAT SERPL-MCNC: 2.87 MG/DL (ref 0.6–1.3)
CROSSMATCH: NORMAL
EOSINOPHIL # BLD AUTO: 0.01 THOUSAND/ΜL (ref 0–0.61)
EOSINOPHIL NFR BLD AUTO: 0 % (ref 0–6)
ERYTHROCYTE [DISTWIDTH] IN BLOOD BY AUTOMATED COUNT: 18.3 % (ref 11.6–15.1)
GFR SERPL CREATININE-BSD FRML MDRD: 15 ML/MIN/1.73SQ M
GLUCOSE SERPL-MCNC: 79 MG/DL (ref 65–140)
HCT VFR BLD AUTO: 26.6 % (ref 34.8–46.1)
HGB BLD-MCNC: 8.4 G/DL (ref 11.5–15.4)
LACTATE SERPL-SCNC: 2.1 MMOL/L (ref 0.5–2)
LYMPHOCYTES # BLD AUTO: 0.45 THOUSANDS/ΜL (ref 0.6–4.47)
LYMPHOCYTES NFR BLD AUTO: 8 % (ref 14–44)
MAGNESIUM SERPL-MCNC: 2 MG/DL (ref 1.6–2.6)
MCH RBC QN AUTO: 28.6 PG (ref 26.8–34.3)
MCHC RBC AUTO-ENTMCNC: 31.6 G/DL (ref 31.4–37.4)
MCV RBC AUTO: 91 FL (ref 82–98)
MONOCYTES # BLD AUTO: 0.31 THOUSAND/ΜL (ref 0.17–1.22)
MONOCYTES NFR BLD AUTO: 5 % (ref 4–12)
NEUTROPHILS # BLD AUTO: 5.03 THOUSANDS/ΜL (ref 1.85–7.62)
NEUTS SEG NFR BLD AUTO: 87 % (ref 43–75)
NRBC BLD AUTO-RTO: 0 /100 WBCS
PHOSPHATE SERPL-MCNC: 3.6 MG/DL (ref 2.3–4.1)
PLATELET # BLD AUTO: 71 THOUSANDS/UL (ref 149–390)
POTASSIUM SERPL-SCNC: 4.4 MMOL/L (ref 3.5–5.3)
RBC # BLD AUTO: 2.94 MILLION/UL (ref 3.81–5.12)
SODIUM SERPL-SCNC: 136 MMOL/L (ref 136–145)
UNIT DISPENSE STATUS: NORMAL
UNIT PRODUCT CODE: NORMAL
UNIT RH: NORMAL
WBC # BLD AUTO: 5.83 THOUSAND/UL (ref 4.31–10.16)

## 2018-04-22 PROCEDURE — 99232 SBSQ HOSP IP/OBS MODERATE 35: CPT | Performed by: INTERNAL MEDICINE

## 2018-04-22 PROCEDURE — 83605 ASSAY OF LACTIC ACID: CPT | Performed by: PHYSICIAN ASSISTANT

## 2018-04-22 PROCEDURE — 84100 ASSAY OF PHOSPHORUS: CPT | Performed by: INTERNAL MEDICINE

## 2018-04-22 PROCEDURE — 80048 BASIC METABOLIC PNL TOTAL CA: CPT | Performed by: INTERNAL MEDICINE

## 2018-04-22 PROCEDURE — 83735 ASSAY OF MAGNESIUM: CPT | Performed by: INTERNAL MEDICINE

## 2018-04-22 PROCEDURE — 85025 COMPLETE CBC W/AUTO DIFF WBC: CPT | Performed by: INTERNAL MEDICINE

## 2018-04-22 PROCEDURE — 99222 1ST HOSP IP/OBS MODERATE 55: CPT | Performed by: INTERNAL MEDICINE

## 2018-04-22 RX ORDER — MIRTAZAPINE 15 MG/1
15 TABLET, FILM COATED ORAL
Status: DISCONTINUED | OUTPATIENT
Start: 2018-04-22 | End: 2018-04-29 | Stop reason: HOSPADM

## 2018-04-22 RX ORDER — DEXAMETHASONE 2 MG/1
2 TABLET ORAL DAILY
Status: DISCONTINUED | OUTPATIENT
Start: 2018-04-22 | End: 2018-04-27

## 2018-04-22 RX ADMIN — HEPARIN SODIUM 5000 UNITS: 5000 INJECTION, SOLUTION INTRAVENOUS; SUBCUTANEOUS at 05:39

## 2018-04-22 RX ADMIN — MIRTAZAPINE 15 MG: 15 TABLET, FILM COATED ORAL at 21:25

## 2018-04-22 RX ADMIN — PANTOPRAZOLE SODIUM 40 MG: 40 TABLET, DELAYED RELEASE ORAL at 05:39

## 2018-04-22 RX ADMIN — POLYETHYLENE GLYCOL 3350, SODIUM SULFATE ANHYDROUS, SODIUM BICARBONATE, SODIUM CHLORIDE, POTASSIUM CHLORIDE 4000 ML: 236; 22.74; 6.74; 5.86; 2.97 POWDER, FOR SOLUTION ORAL at 16:48

## 2018-04-22 RX ADMIN — VITAMIN D, TAB 1000IU (100/BT) 1000 UNITS: 25 TAB at 09:15

## 2018-04-22 RX ADMIN — DEXAMETHASONE 2 MG: 2 TABLET ORAL at 14:35

## 2018-04-22 RX ADMIN — PRAVASTATIN SODIUM 40 MG: 40 TABLET ORAL at 16:48

## 2018-04-22 RX ADMIN — CINACALCET HYDROCHLORIDE 30 MG: 30 TABLET, COATED ORAL at 09:15

## 2018-04-22 RX ADMIN — HEPARIN SODIUM 5000 UNITS: 5000 INJECTION, SOLUTION INTRAVENOUS; SUBCUTANEOUS at 14:35

## 2018-04-22 RX ADMIN — RENAGEL 800 MG: 800 TABLET ORAL at 11:55

## 2018-04-22 RX ADMIN — HEPARIN SODIUM 5000 UNITS: 5000 INJECTION, SOLUTION INTRAVENOUS; SUBCUTANEOUS at 21:25

## 2018-04-22 RX ADMIN — RENAGEL 800 MG: 800 TABLET ORAL at 16:48

## 2018-04-22 NOTE — PROGRESS NOTES
NEPHROLOGY PROGRESS NOTE   Fozia Up 74 y o  female MRN: [id number]  Unit/Bed#: [facility] 808-01 Encounter: [id number]  Reason for Consult: ESRD    Assessment/Plan:  1  End-stage renal disease, maintenance hemodialysis Monday Wednesday Friday, Ondina Mclaughlin  2  Acute on chronic anemia, status post PRBC transfusion, continue with Epogen  3  Enlarged liver with noted liver cysts  4  Ascites  5  Polycystic kidney disease   6  Generalized weakness fatigue  7  Failure to thrive  8  Discrepant total protein and albumin, serum electrophoresis pending  9  Hypotension, discontinue calcium channel blocker  10  Mineral bone disorder, continue with Sensipar    PLAN:  · Await GI evaluation, ? Paracentesis  · For comfort whether paracentesis would be beneficial ? , also would consider possibly looking at any severely enlarged liver cysts for possible sclerosis  · Continue maintenance hemodialysis Monday Wednesday Friday  · Continue monitor hemoglobin closely    SUBJECTIVE:  Seen and examined  Patient awake alert  Still with poor appetite  Complains of abdominal distention, pain especially with palpation  No significant chest pain or shortness of Breath  Review of Systems    OBJECTIVE:  Current Weight: Weight - Scale: 43 3 kg (95 lb 8 oz)  Vitals:    04/21/18 1530 04/21/18 1643 04/21/18 2306 04/22/18 0700   BP: (!) 105/36 104/52 113/53 122/57   BP Location:  Right arm Right arm Right arm   Pulse: 82 74 93 102   Resp:  20 20 20   Temp:  98 2 °F (36 8 °C) 98 2 °F (36 8 °C) 98 °F (36 7 °C)   TempSrc:  Oral Oral Oral   SpO2:  93% 93% 93%   Weight:       Height:           Intake/Output Summary (Last 24 hours) at 04/22/18 1334  Last data filed at 04/22/18 1200   Gross per 24 hour   Intake             1115 ml   Output              502 ml   Net              613 ml       Physical Exam   Constitutional: No distress  HENT:   Head: Normocephalic  Eyes: Conjunctivae are normal    Neck: Neck supple     Cardiovascular: Normal rate and regular rhythm  Pulmonary/Chest: Effort normal and breath sounds normal    Abdominal: She exhibits distension and ascites  There is tenderness  Musculoskeletal: She exhibits no edema  Neurological: She is alert  Skin: Skin is dry         Medications:    Current Facility-Administered Medications:     acetaminophen (TYLENOL) tablet 650 mg, 650 mg, Oral, Q6H PRN, Martin Betty, PA-C    calcium carbonate (TUMS) chewable tablet 1,000 mg, 1,000 mg, Oral, Daily PRN, Martin Rudye, PA-C    cholecalciferol (VITAMIN D3) tablet 1,000 Units, 1,000 Units, Oral, Daily, Martin Dole, PA-C, 1,000 Units at 04/22/18 0915    cinacalcet (SENSIPAR) tablet 30 mg, 30 mg, Oral, Daily With Breakfast, Martin Dole, PA-C, 30 mg at 04/22/18 0915    dexamethasone (DECADRON) tablet 2 mg, 2 mg, Oral, Daily, MD Almita Spain  [START ON 4/23/2018] epoetin beth (EPOGEN,PROCRIT) injection 4,000 Units, 4,000 Units, Intravenous, After Dialysis **AND** [START ON 4/23/2018] epoetin beth (EPOGEN,PROCRIT) injection 3,000 Units, 3,000 Units, Intravenous, After Dialysis, Eric Thornton, DO    heparin (porcine) injection 2,000 Units, 2,000 Units, Intravenous, PRN, Belle Mayen DO    heparin (porcine) subcutaneous injection 5,000 Units, 5,000 Units, Subcutaneous, Q8H [address], 5,000 Units at 04/22/18 0539 **AND** Platelet count, , , Once, Martin Rudye, PA-C    mirtazapine (REMERON) tablet 15 mg, 15 mg, Oral, HS, Nataliya Mariano MD    ondansetron [facility]) injection 4 mg, 4 mg, Intravenous, Q6H PRN, Martin Dole, PA-C, 4 mg at 04/21/18 0824    pantoprazole (PROTONIX) EC tablet 40 mg, 40 mg, Oral, Early Morning, Martin Dole, PA-C, 40 mg at 04/22/18 0539    polyethylene glycol (MIRALAX) packet 17 g, 17 g, Oral, Daily PRN, Martin Hernández PA-C    pravastatin (PRAVACHOL) tablet 40 mg, 40 mg, Oral, Daily With Elvie Tomlinson PA-C, 40 mg at 04/21/18 1630    sevelamer (RENAGEL) tablet 800 mg, 800 mg, Oral, TID With Meals, Roopa Bhakta PA-C, 800 mg at 04/22/18 1155    Laboratory Results:    Results from last 7 days  Lab Units 04/22/18  0452 04/21/18  1348 04/20/18  1453 04/18/18  1030   WBC Thousand/uL 5 83  --  6 80  --    HEMOGLOBIN g/dL 8 4*  --  7 6* 7 4*   HEMATOCRIT % 26 6*  --  24 3*  --    PLATELETS Thousands/uL 71* 72* 102*  --    SODIUM mmol/L 136  --  135*  --    POTASSIUM mmol/L 4 4  --  4 1  --    CHLORIDE mmol/L 100  --  99*  --    CO2 mmol/L 28  --  28  --    BUN mg/dL 20  --  20  --    CREATININE mg/dL 2 87*  --  2 93*  --    CALCIUM mg/dL 9 0  --  9 1  --    MAGNESIUM mg/dL 2 0  --   --   --    PHOSPHORUS mg/dL 3 6  --   --   --    TOTAL PROTEIN g/dL  --   --  6 7  --    GLUCOSE RANDOM mg/dL 79  --  102  --

## 2018-04-22 NOTE — PROGRESS NOTES
Progress Note - Latrice Rivera 1945, 74 y o  female MRN: [id number]    Unit/Bed#: [facility] 808-01 Encounter: [id number]    Primary Care Provider: Mariano Mejia MD   Date and time admitted to hospital: 4/20/2018  1:40 PM        Elevated troponin   Assessment & Plan    · Troponin mildly elevated- no EKG changes or chest pain  · Trend is flat, likely related to end-stage renal disease  · Patient has no symptoms of chest pain, shortness of breath or any other angina equivalents at this time  · Possible component related to type 2 secondary to severe anemia  · No further intervention indicated at this time  · Monitor clinically        Symptomatic anemia   Assessment & Plan    · Acute on chronic anemia, possibly related to kidney disease versus other causes  · Earlier this year underwent EGD/colonscopy at [facility]: Colonoscopy with large internal and external hemorrhoids and small non-bleeding rectal ulcer, EGD with antral gastritis, non erosive duodenitis and hiatal hernia  · Hematology evaluated the patient, sent iron panel, I have added erythropoietin level  · CT chest abdomen and pelvis negative for malignant  · Component of very poor nutritional status  · Status post 1 PRBC on April 21st  · Hemoglobin today 8 4, patient feels better after blood transfusion  · Consult GI, patient with recent endoscopy at [facility], will follow up final recommendations  · Of note, thrombocytopenia noted, Okay to continue with heparin for DVT prophylaxis at this time, as long as platelets above 50, platelet are stable around 70        Weight loss   Assessment & Plan    Patient was evaluated by palliative care today, agreeable to try mirtazapine and low-dose steroid for the treatment of her poor appetite and likely underlying depression/anxiety  Continue with Ensure  Monitor p o  intake closely  Nutritional consult pending         End stage renal disease ([facility] [address] )   Assessment & Plan    · Dialysis MWF  · Nephrology following, dialysis on Monday        * Weakness   Assessment & Plan    · Multifactorial, likely related to very poor nutritional status with weight loss as well as symptomatic anemia  · PT and OT to evaluate patient  · Treat underlying causes          VTE Pharmacologic Prophylaxis: yes  Pharmacologic: Heparin  Mechanical VTE Prophylaxis in Place: Yes     Patient Centered Rounds: I have performed bedside rounds with nursing staff today      Discussions with Specialists or Other Care Team Provider:   GI, palliative care     Education and Discussions with Family / Patient:  Patient, daughter, son     Time Spent for Care: 25 minutes  More than 50% of total time spent on counseling and coordination of care as described above      Current Length of Stay: 2 day(s)     Current Patient Status: Inpatient   Certification Statement: The patient will continue to require additional inpatient hospital stay due to Refer to above     Discharge Plan: To be determined although patient would like to speak with  to go to nursing home     Code Status: Level 2 - DNAR: but accepts endotracheal intubation      Subjective:   Patient states that she is feeling better after receiving blood yesterday  She also reported that she ate more than she normally does at home for both breakfast and lunch  No other complaints or concerns at this time    Objective:     Vitals:   Temp (24hrs), Av 5 °F (36 9 °C), Min:97 6 °F (36 4 °C), Max:99 6 °F (37 6 °C)    HR:  [] 102  Resp:  [12-20] 20  BP: ()/(25-57) 122/57  SpO2:  [93 %] 93 %  Body mass index is 15 65 kg/m²  Input and Output Summary (last 24 hours): Intake/Output Summary (Last 24 hours) at 18 1317  Last data filed at 18 1200   Gross per 24 hour   Intake             1315 ml   Output              502 ml   Net              813 ml       Physical Exam:     Physical Exam   Constitutional: She is oriented to person, place, and time  She appears well-developed  No distress  Cachectic   Cardiovascular: Normal rate, regular rhythm and normal heart sounds  Exam reveals no friction rub  No murmur heard  Pulmonary/Chest: Effort normal  No respiratory distress  She has no wheezes  She has no rales  Abdominal: Soft  She exhibits no distension  There is no tenderness  There is no rebound  Musculoskeletal: She exhibits no edema  Neurological: She is alert and oriented to person, place, and time  She exhibits normal muscle tone  Skin: Skin is warm  Psychiatric: She has a normal mood and affect  Additional Data:     Labs:      Results from last 7 days  Lab Units 04/22/18  0452   WBC Thousand/uL 5 83   HEMOGLOBIN g/dL 8 4*   HEMATOCRIT % 26 6*   PLATELETS Thousands/uL 71*   NEUTROS PCT % 87*   LYMPHS PCT % 8*   MONOS PCT % 5   EOS PCT % 0       Results from last 7 days  Lab Units 04/22/18  0452 04/20/18  1453   SODIUM mmol/L 136 135*   POTASSIUM mmol/L 4 4 4 1   CHLORIDE mmol/L 100 99*   CO2 mmol/L 28 28   BUN mg/dL 20 20   CREATININE mg/dL 2 87* 2 93*   CALCIUM mg/dL 9 0 9 1   TOTAL PROTEIN g/dL  --  6 7   BILIRUBIN TOTAL mg/dL  --  1 18*   ALK PHOS U/L  --  121*   ALT U/L  --  7*   AST U/L  --  18   GLUCOSE RANDOM mg/dL 79 102           * I Have Reviewed All Lab Data Listed Above  * Additional Pertinent Lab Tests Reviewed:  All Labs Within Last 24 Hours Reviewed    Imaging:    Imaging Reports Reviewed Today Include:  None  Imaging Personally Reviewed by Myself Includes:  None    Recent Cultures (last 7 days):           Last 24 Hours Medication List:     Current Facility-Administered Medications:  acetaminophen 650 mg Oral Q6H PRN Esthela Obando PA-C   calcium carbonate 1,000 mg Oral Daily PRN Esthela Obando PA-C   cholecalciferol 1,000 Units Oral Daily Esthela Obando PA-C   cinacalcet 30 mg Oral Daily With Breakfast Esthela Obando PA-C   dexamethasone 2 mg Oral Daily Lesly Forbes MD   [START ON 4/23/2018] epoetin beth 4,000 Units Intravenous After Dialysis Savannah Mayen DO   And       [START ON 4/23/2018] epoetin beth 3,000 Units Intravenous After Dialysis Savannah Mayen DO   heparin (porcine) 2,000 Units Intravenous PRN Savannah Mayen DO   heparin (porcine) 5,000 Units Subcutaneous [facility] Frances Presser, PA-C   mirtazapine 15 mg Oral HS Elvis Manzo MD   ondansetron 4 mg Intravenous Q6H PRN Frances Presser, PA-C   pantoprazole 40 mg Oral Early Morning Frances Presser, PA-C   polyethylene glycol 17 g Oral Daily PRN Frances Presser, PA-C   pravastatin 40 mg Oral Daily With Dinner Frances Presser, PA-C   sevelamer 800 mg Oral TID With Meals Frances Presser, PA-C        Today, Patient Was Seen By: Federico Zamora MD    ** Please Note: Dragon 360 Dictation voice to text software may have been used in the creation of this document   **

## 2018-04-22 NOTE — ASSESSMENT & PLAN NOTE
· Troponin mildly elevated- no EKG changes or chest pain  · Trend is flat, likely related to end-stage renal disease  · Patient has no symptoms of chest pain, shortness of breath or any other angina equivalents at this time  · Possible component related to type 2 secondary to severe anemia  · No further intervention indicated at this time  · Monitor clinically

## 2018-04-22 NOTE — ASSESSMENT & PLAN NOTE
· Acute on chronic anemia, possibly related to kidney disease versus other causes  · Earlier this year underwent EGD/colonscopy at [facility]: Colonoscopy with large internal and external hemorrhoids and small non-bleeding rectal ulcer, EGD with antral gastritis, non erosive duodenitis and hiatal hernia  · Hematology evaluated the patient, sent iron panel, I have added erythropoietin level  · CT chest abdomen and pelvis negative for malignant  · Component of very poor nutritional status  · Status post 1 PRBC on April 21st  · Hemoglobin today 8 4, patient feels better after blood transfusion  · Consult GI, patient with [place] [facility], will follow up final recommendations  · Of note, thrombocytopenia noted, Okay to continue with heparin for DVT prophylaxis at this time, as long as platelets above 50, platelet are stable around 70

## 2018-04-22 NOTE — ASSESSMENT & PLAN NOTE
Patient was evaluated by palliative care today, agreeable to try mirtazapine and low-dose steroid for the treatment of her poor appetite and likely underlying depression/anxiety  Continue with Ensure  Monitor p o  intake closely  Nutritional consult pending

## 2018-04-22 NOTE — CONSULTS
Consultation - [address] [facility]  Calelena Forte 74 y o  female MRN: [id number]  Unit/Bed#: [facility] 808-01 Encounter: [id number]         Reason for Consult / Principal Problem:  Anemia and weight loss    HPI: Reyes Lema is a 49-year-old female with history of chronic anemia and polycystic kidney disease on hemodialysis  Patient was admitted to the hospital for extremely poor appetite and generalized weakness concerning for malignancy  CT of the abdomen revealed multiple liver cysts, moderate ascites and no primary source of malignancy however it is noted that she did not receive IV contrast   GI was consulted for further workup of weight loss  Patient was actually seen in the outpatient setting in February  Dr Kirsten Jose recommended the patient have a EGD and colonoscopy  The patient's daughter reports to me that she had this done at [facility] in March  EGD and colonoscopy reports available on Care everywhere  Colonoscopy had poor prep, however revealed large internal and external hemorrhoids with a small nonbleeding rectal ulcer that was biopsied  Result of rectal ulcer biopsy revealed that this is likely from ischemic changes  EGD showed antral gastritis and nonerosive duodenitis  Biopsies were negative for celiac disease  Patient has never had small-bowel evaluation  She denies nausea or vomiting  She denies melena or hematochezia  Review of Systems:    CONSTITUTIONAL: Denies any fever, chills, or rigors  Positive for poor appetite and recent weight loss  HEENT: No earache or tinnitus  Denies hearing loss or visual disturbances  CARDIOVASCULAR: No chest pain or palpitations  RESPIRATORY: Denies any cough, hemoptysis, shortness of breath or dyspnea on exertion  GASTROINTESTINAL: As noted in the History of Present Illness  GENITOURINARY: No problems with urination  Denies any hematuria or dysuria  NEUROLOGIC: No dizziness or vertigo, denies headaches     MUSCULOSKELETAL: Denies any muscle or joint pain  SKIN: Denies skin rashes or itching  ENDOCRINE: Denies excessive thirst  Denies intolerance to heat or cold  PSYCHOSOCIAL: Denies depression or anxiety  Denies any recent memory loss  Historical Information   Past Medical History:   Diagnosis Date    Chronic kidney disease     Dyslipidemia      History reviewed  No pertinent surgical history  Social History   History   Alcohol Use No     History   Drug Use No     History   Smoking Status    Never Smoker   Smokeless Tobacco    Never Used     History reviewed  No pertinent family history       Meds/Allergies     Current Facility-Administered Medications   Medication Dose Route Frequency    acetaminophen (TYLENOL) tablet 650 mg  650 mg Oral Q6H PRN    calcium carbonate (TUMS) chewable tablet 1,000 mg  1,000 mg Oral Daily PRN    cholecalciferol (VITAMIN D3) tablet 1,000 Units  1,000 Units Oral Daily    cinacalcet (SENSIPAR) tablet 30 mg  30 mg Oral Daily With Breakfast    dexamethasone (DECADRON) tablet 2 mg  2 mg Oral Daily    [START ON 4/23/2018] epoetin beth (EPOGEN,PROCRIT) injection 4,000 Units  4,000 Units Intravenous After Dialysis    And    [START ON 4/23/2018] epoetin beth (EPOGEN,PROCRIT) injection 3,000 Units  3,000 Units Intravenous After Dialysis    heparin (porcine) injection 2,000 Units  2,000 Units Intravenous PRN    heparin (porcine) subcutaneous injection 5,000 Units  5,000 Units Subcutaneous Q8H [address]    mirtazapine (REMERON) tablet 15 mg  15 mg Oral HS    ondansetron (ZOFRAN) injection 4 mg  4 mg Intravenous Q6H PRN    pantoprazole (PROTONIX) EC tablet 40 mg  40 mg Oral Early Morning    polyethylene glycol (MIRALAX) packet 17 g  17 g Oral Daily PRN    pravastatin (PRAVACHOL) tablet 40 mg  40 mg Oral Daily With Dinner    sevelamer (RENAGEL) tablet 800 mg  800 mg Oral TID With Meals       No Known Allergies      Objective     Blood pressure 122/57, pulse 102, temperature 98 °F (36 7 °C), temperature source Oral, resp  rate 20, height 5' 5 5" (1 664 m), weight 43 3 kg (95 lb 8 oz), SpO2 93 %  Intake/Output Summary (Last 24 hours) at 04/22/18 1253  Last data filed at 04/22/18 1200   Gross per 24 hour   Intake             1315 ml   Output              502 ml   Net              813 ml         PHYSICAL EXAM:      General Appearance:   Alert and oriented x 3  Cooperative, and in no respiratory distress  Cachectic appearing  HEENT:   Normocephalic, atraumatic, anicteric      Neck:  Supple, symmetrical, trachea midline   Lungs:   Clear to auscultation bilaterally; no rales, rhonchi or wheezing; respirations unlabored    Heart[de-identified]   Regular rate and rhythm   Abdomen:   Soft, non-tender, mildly distended; hyperactive bowel sounds; no masses, no organomegaly    Genitalia:   Deferred    Rectal:   Deferred    Extremities:  No cyanosis, clubbing or edema    Pulses:  2+ and symmetric all extremities    Skin:  Skin color, texture, turgor normal, no rashes or lesions    Lymph nodes:  No palpable cervical or supraclavicular lymphadenopathy        Lab Results:     Results from last 7 days  Lab Units 04/22/18  0452   WBC Thousand/uL 5 83   HEMOGLOBIN g/dL 8 4*   HEMATOCRIT % 26 6*   PLATELETS Thousands/uL 71*   NEUTROS PCT % 87*   LYMPHS PCT % 8*   MONOS PCT % 5   EOS PCT % 0       Results from last 7 days  Lab Units 04/22/18  0452 04/20/18  1453   SODIUM mmol/L 136 135*   POTASSIUM mmol/L 4 4 4 1   CHLORIDE mmol/L 100 99*   CO2 mmol/L 28 28   BUN mg/dL 20 20   CREATININE mg/dL 2 87* 2 93*   CALCIUM mg/dL 9 0 9 1   TOTAL PROTEIN g/dL  --  6 7   BILIRUBIN TOTAL mg/dL  --  1 18*   ALK PHOS U/L  --  121*   ALT U/L  --  7*   AST U/L  --  18   GLUCOSE RANDOM mg/dL 79 102               Imaging Studies: I have personally reviewed pertinent imaging studies  Ct Chest Abdomen Pelvis Wo Contrast    Result Date: 4/21/2018  Impression: Severe emphysema  There is no evidence of pulmonary malignancy   Markedly enlarged kidneys with innumerable cysts, consistent with autosomal dominant polycystic kidney disease  There are also innumerable liver cysts in a severely enlarged liver, many rim calcified  No definite evidence of malignancy identified in the abdomen and pelvis, but please note that detection is limited without IV contrast Moderate ascites in the abdomen and pelvis  Workstation performed: [id number]     Xr Chest 2 Views    Result Date: 4/20/2018  Impression: No acute cardiopulmonary disease  Workstation performed: [id number]       ASSESSMENT and PLAN:      1) Acute on chronic anemia - On admission, hemoglobin 7 6  Patient given 1 unit, hemoglobin 8 4 today  Patient noted to be iron deficient, with elevated ferritin above 1000  Patient is on a PO as an outpatient  Patient had recent EGD and colonoscopy at [facility]  - Per discussion with Dr Kaylee Hamm, would plan for 2 day prep for colonoscopy to ensure there are no underlying AVMs or ulcerations causing patient's anemia   - Would consider small-bowel study such as pill endoscopy to rule out occult bleeding, but this could be done on an outpatient basis  - Continue note monitor hemoglobin and for overt bleeding    2) Poor appetite - Patient and patient's daughter cannot report on why her mother is having poor appetite  - If above studies are negative for malignancy, patient and family may need to discuss supplemental feeding  - Agree with palliative care consult for anorexia     3) Rectal ulcer - Per biopsy results from [facility], may be due to ischemic changes  - Mesenteric duplex ordered  - Lactic acid     4) Innumerable amount of cysts in the liver - patient reports that she was told that she has polycystic liver disease  Patient has known polycystic kidney disease  The patient was seen and examined by Dr Kaylee Hamm, all diaz medical decisions were made with Dr Kaylee Hamm  Thank you for allowing us to participate in the care of this pleasant patient    We will follow up with you closely

## 2018-04-23 ENCOUNTER — APPOINTMENT (INPATIENT)
Dept: DIALYSIS | Facility: HOSPITAL | Age: 73
DRG: 682 | End: 2018-04-23
Attending: INTERNAL MEDICINE
Payer: MEDICARE

## 2018-04-23 PROBLEM — E43 SEVERE PROTEIN-CALORIE MALNUTRITION (HCC): Status: ACTIVE | Noted: 2018-04-23

## 2018-04-23 LAB
ANION GAP SERPL CALCULATED.3IONS-SCNC: 6 MMOL/L (ref 4–13)
ATRIAL RATE: 108 BPM
ATRIAL RATE: 85 BPM
BASOPHILS # BLD AUTO: 0.01 THOUSANDS/ΜL (ref 0–0.1)
BASOPHILS NFR BLD AUTO: 0 % (ref 0–1)
BUN SERPL-MCNC: 38 MG/DL (ref 5–25)
CALCIUM SERPL-MCNC: 8.9 MG/DL (ref 8.3–10.1)
CHLORIDE SERPL-SCNC: 99 MMOL/L (ref 100–108)
CO2 SERPL-SCNC: 28 MMOL/L (ref 21–32)
CREAT SERPL-MCNC: 4.09 MG/DL (ref 0.6–1.3)
EOSINOPHIL # BLD AUTO: 0 THOUSAND/ΜL (ref 0–0.61)
EOSINOPHIL NFR BLD AUTO: 0 % (ref 0–6)
ERYTHROCYTE [DISTWIDTH] IN BLOOD BY AUTOMATED COUNT: 17.7 % (ref 11.6–15.1)
GFR SERPL CREATININE-BSD FRML MDRD: 10 ML/MIN/1.73SQ M
GLUCOSE SERPL-MCNC: 94 MG/DL (ref 65–140)
HCT VFR BLD AUTO: 28.5 % (ref 34.8–46.1)
HGB BLD-MCNC: 8.9 G/DL (ref 11.5–15.4)
LYMPHOCYTES # BLD AUTO: 0.66 THOUSANDS/ΜL (ref 0.6–4.47)
LYMPHOCYTES NFR BLD AUTO: 7 % (ref 14–44)
MAGNESIUM SERPL-MCNC: 2 MG/DL (ref 1.6–2.6)
MCH RBC QN AUTO: 28 PG (ref 26.8–34.3)
MCHC RBC AUTO-ENTMCNC: 31.2 G/DL (ref 31.4–37.4)
MCV RBC AUTO: 90 FL (ref 82–98)
MONOCYTES # BLD AUTO: 0.38 THOUSAND/ΜL (ref 0.17–1.22)
MONOCYTES NFR BLD AUTO: 4 % (ref 4–12)
NEUTROPHILS # BLD AUTO: 8.42 THOUSANDS/ΜL (ref 1.85–7.62)
NEUTS SEG NFR BLD AUTO: 89 % (ref 43–75)
NRBC BLD AUTO-RTO: 0 /100 WBCS
P AXIS: 80 DEGREES
PHOSPHATE SERPL-MCNC: 4.8 MG/DL (ref 2.3–4.1)
PLATELET # BLD AUTO: 124 THOUSANDS/UL (ref 149–390)
PMV BLD AUTO: 13.1 FL (ref 8.9–12.7)
POTASSIUM SERPL-SCNC: 5.2 MMOL/L (ref 3.5–5.3)
PR INTERVAL: 154 MS
QRS AXIS: 53 DEGREES
QRS AXIS: 57 DEGREES
QRSD INTERVAL: 80 MS
QRSD INTERVAL: 80 MS
QT INTERVAL: 370 MS
QT INTERVAL: 378 MS
QTC INTERVAL: 445 MS
QTC INTERVAL: 449 MS
RBC # BLD AUTO: 3.18 MILLION/UL (ref 3.81–5.12)
SODIUM SERPL-SCNC: 133 MMOL/L (ref 136–145)
T WAVE AXIS: 83 DEGREES
T WAVE AXIS: 84 DEGREES
VENTRICULAR RATE: 85 BPM
VENTRICULAR RATE: 87 BPM
WBC # BLD AUTO: 9.48 THOUSAND/UL (ref 4.31–10.16)

## 2018-04-23 PROCEDURE — 99232 SBSQ HOSP IP/OBS MODERATE 35: CPT | Performed by: INTERNAL MEDICINE

## 2018-04-23 PROCEDURE — 80048 BASIC METABOLIC PNL TOTAL CA: CPT | Performed by: INTERNAL MEDICINE

## 2018-04-23 PROCEDURE — 93010 ELECTROCARDIOGRAM REPORT: CPT | Performed by: INTERNAL MEDICINE

## 2018-04-23 PROCEDURE — 99232 SBSQ HOSP IP/OBS MODERATE 35: CPT | Performed by: FAMILY MEDICINE

## 2018-04-23 PROCEDURE — 84100 ASSAY OF PHOSPHORUS: CPT | Performed by: INTERNAL MEDICINE

## 2018-04-23 PROCEDURE — 83735 ASSAY OF MAGNESIUM: CPT | Performed by: INTERNAL MEDICINE

## 2018-04-23 PROCEDURE — 85025 COMPLETE CBC W/AUTO DIFF WBC: CPT | Performed by: INTERNAL MEDICINE

## 2018-04-23 RX ADMIN — EPOETIN ALFA 3000 UNITS: 3000 SOLUTION INTRAVENOUS; SUBCUTANEOUS at 10:39

## 2018-04-23 RX ADMIN — ONDANSETRON 4 MG: 2 INJECTION INTRAMUSCULAR; INTRAVENOUS at 10:45

## 2018-04-23 RX ADMIN — PANTOPRAZOLE SODIUM 40 MG: 40 TABLET, DELAYED RELEASE ORAL at 06:07

## 2018-04-23 RX ADMIN — VITAMIN D, TAB 1000IU (100/BT) 1000 UNITS: 25 TAB at 14:14

## 2018-04-23 RX ADMIN — EPOETIN ALFA 4000 UNITS: 4000 SOLUTION INTRAVENOUS; SUBCUTANEOUS at 10:39

## 2018-04-23 RX ADMIN — HEPARIN SODIUM 5000 UNITS: 5000 INJECTION, SOLUTION INTRAVENOUS; SUBCUTANEOUS at 06:06

## 2018-04-23 NOTE — PROGRESS NOTES
Progress Note - Diane Lynch 1945, 74 y o  female MRN: [id number]    Unit/Bed#: [facility] 808-01 Encounter: [id number]    Primary Care Provider: Zachariah Camejo MD   Date and time admitted to hospital: 4/20/2018  1:40 PM        Severe protein-calorie malnutrition ([address] )   Assessment & Plan    Malnutrition Findings:   Malnutrition type: Chronic illness  Degree of Malnutrition: Other severe protein calorie malnutrition (<75% energy intake versus needs for > 1 month resulting in 6 5#(6 4%) wt loss in past 2 months exhibiting hollow orbital look and hollowing temples and protruding clavicle; Treating with Ensure Enlive TID;)    BMI Findings:  BMI Classifications: Underweight < 18 5     Body mass index is 15 65 kg/m²     Continue with prednisone, mirtazapine, monitor p o  intake, nutritionist following          Elevated troponin   Assessment & Plan    · Troponin mildly elevated- no EKG changes or chest pain  · Trend is flat, likely related to end-stage renal disease  · Patient has no symptoms of chest pain, shortness of breath or any other angina equivalents at this time  · Possible component related to type 2 secondary to severe anemia  · No further intervention indicated at this time  · Monitor clinically        Symptomatic anemia   Assessment & Plan    · Acute on chronic anemia, possibly related to kidney disease versus other causes  · Earlier this year underwent EGD/colonscopy at [facility]: Colonoscopy with large internal and external hemorrhoids and small non-bleeding rectal ulcer, EGD with antral gastritis, non erosive duodenitis and hiatal hernia  · Hematology evaluated the patient, sent iron panel, I have added erythropoietin level  · CT chest abdomen and pelvis negative for malignant  · Component of very poor nutritional status  · Status post 1 PRBC on April 21st  · Hemoglobin today 8 9, patient feels better after blood transfusion  · Patient was found to have a rectal ulcer on previous colonoscopy at [facility], this lesion was not biopsied secondary to poor preparation at that time, she was scheduled to have another colonoscopy tomorrow with our GI team, but this time she is refusing as she is unable to drink preparation    Will await for further GI recommendation if an alternative can be given to the patient  · Daughter was informed that patient is refusing colonoscopy        Weight loss   Assessment & Plan    Patient was evaluated by palliative care today, agreeable to try mirtazapine and low-dose steroid for the treatment of her poor appetite and likely underlying depression/anxiety  Continue with Ensure  Monitor p o  intake closely  Nutritionist following  Colonoscopy as above  For mesenteric artery Doppler tomorrow morning, will follow        End stage renal disease ([facility] [address] )   Assessment & Plan    · Dialysis MWF  · Nephrology following  · Dialysis today, vomiting and nausea after dialysis, continue with Zofran p r n  and other supportive care for now        * Weakness   Assessment & Plan    · Multifactorial, likely related to very poor nutritional status with weight loss as well as symptomatic anemia  · PT and OT to evaluate patient  · Treat underlying causes          VTE Pharmacologic Prophylaxis: yes  Pharmacologic: Heparin  Mechanical VTE Prophylaxis in Place: Yes     Patient Centered Rounds: I have performed bedside rounds with nursing staff today      Discussions with Specialists or Other Care Team Provider:    nephrology     Education and Discussions with Family / Patient:  Patient, daughter     Time Spent for Care: 35 minutes   More than 50% of total time spent on counseling and coordination of care as described above      Current Length of Stay: 3 day(s)     Current Patient Status: Inpatient   Certification Statement: The patient will continue to require additional inpatient hospital stay due to Refer to above     Discharge Plan:  To be determined although patient would like to speak with  to go to nursing home     Code Status: Level 2 - DNAR: but accepts endotracheal intubation    Subjective: The patient complains of nausea and vomiting after dialysis  She states that the Zofran helped  Objective:     Vitals:   Temp (24hrs), Av 3 °F (36 3 °C), Min:97 °F (36 1 °C), Max:97 5 °F (36 4 °C)    HR:  [65-98] 98  Resp:  [16-18] 18  BP: ()/(25-56) 108/39  SpO2:  [95 %] 95 %  Body mass index is 15 65 kg/m²  Input and Output Summary (last 24 hours): Intake/Output Summary (Last 24 hours) at 18 1510  Last data filed at 18 1114   Gross per 24 hour   Intake              940 ml   Output              523 ml   Net              417 ml       Physical Exam:     Physical Exam   Constitutional: She is oriented to person, place, and time  Cachectic   Cardiovascular: Normal rate, regular rhythm and normal heart sounds  Exam reveals no friction rub  No murmur heard  Pulmonary/Chest: Effort normal  No respiratory distress  She has no wheezes  She has no rales  Abdominal: Soft  She exhibits no distension  There is no tenderness  There is no rebound  Musculoskeletal: She exhibits no edema  Neurological: She is alert and oriented to person, place, and time  She exhibits normal muscle tone  Skin: Skin is warm  Psychiatric: She has a normal mood and affect         Additional Data:     Labs:      Results from last 7 days  Lab Units 18  0807   WBC Thousand/uL 9 48   HEMOGLOBIN g/dL 8 9*   HEMATOCRIT % 28 5*   PLATELETS Thousands/uL 124*   NEUTROS PCT % 89*   LYMPHS PCT % 7*   MONOS PCT % 4   EOS PCT % 0       Results from last 7 days  Lab Units 18  0807  18  1453   SODIUM mmol/L 133*  < > 135*   POTASSIUM mmol/L 5 2  < > 4 1   CHLORIDE mmol/L 99*  < > 99*   CO2 mmol/L 28  < > 28   BUN mg/dL 38*  < > 20   CREATININE mg/dL 4 09*  < > 2 93*   CALCIUM mg/dL 8 9  < > 9 1   TOTAL PROTEIN g/dL  --   --  6 7   BILIRUBIN TOTAL mg/dL  --   --  1 18*   ALK PHOS U/L  --   --  121* ALT U/L  --   --  7*   AST U/L  --   --  18   GLUCOSE RANDOM mg/dL 94  < > 102   < > = values in this interval not displayed  * I Have Reviewed All Lab Data Listed Above  * Additional Pertinent Lab Tests Reviewed: All Labs Within Last 24 Hours Reviewed    Imaging:    Imaging Reports Reviewed Today Include:  None  Imaging Personally Reviewed by Myself Includes:  None    Recent Cultures (last 7 days):           Last 24 Hours Medication List:     Current Facility-Administered Medications:  acetaminophen 650 mg Oral Q6H PRN Frances Presser, PA-C   calcium carbonate 1,000 mg Oral Daily PRN Frances Presser, PA-C   cholecalciferol 1,000 Units Oral Daily Frances Presser, PA-C   cinacalcet 30 mg Oral Daily With Breakfast Frances Presser, PA-C   dexamethasone 2 mg Oral Daily Elvis Manzo MD   epoetin beth 4,000 Units Intravenous After Dialysis [facility] DO Akanksha   And       epoetin beth 3,000 Units Intravenous After Dialysis Savannah Mayen DO   heparin (porcine) 2,000 Units Intravenous PRN Savannah Mayen DO   heparin (porcine) 5,000 Units Subcutaneous Q8H [facility] Frances Presser, PA-C   mirtazapine 15 mg Oral HS Elvis Manzo MD   ondansetron 4 mg Intravenous Q6H PRN Frances Presser, PA-C   pantoprazole 40 mg Oral Early Morning Frances Presser, PA-C   polyethylene glycol 17 g Oral Daily PRN Frances Presser, PA-C   pravastatin 40 mg Oral Daily With Alonna Zamora, PA-C   sevelamer 800 mg Oral TID With Meals Frances Presser, PA-C        Today, Patient Was Seen By: Federico Zamora MD    ** Please Note: Dragon 360 Dictation voice to text software may have been used in the creation of this document   **

## 2018-04-23 NOTE — PROGRESS NOTES
Pt unable to tolerate golytely bowel prep  Pt stating, "I've gone through this before, there's no way I can finish this " Pt with very poor appetite, lethargy, and flat affect  Less than one cup of prep consumed  Discussed with Dr Tonja Gaytan the unlikelihood of pt being able to complete prep  Buckner Duane with SLIM aware prep incomplete  Pt resting, un wanting to be bothered

## 2018-04-23 NOTE — ASSESSMENT & PLAN NOTE
Patient was evaluated by palliative care today, agreeable to try mirtazapine and low-dose steroid for the treatment of her poor appetite and likely underlying depression/anxiety  Continue with Ensure  Monitor p o  intake closely  Nutritionist following  Colonoscopy as above  For mesenteric artery Doppler tomorrow morning, will follow

## 2018-04-23 NOTE — ASSESSMENT & PLAN NOTE
· Dialysis MWF  · Nephrology following  · Dialysis today, vomiting and nausea after dialysis, continue with Zofran p r n  and other supportive care for now

## 2018-04-23 NOTE — SOCIAL WORK
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

## 2018-04-23 NOTE — SOCIAL WORK
CM met with pt and her son and daughter all aware cm role at discharge   Pt family requesting referral to juan joséale referral in ecin as such   CM provided all contact information and snf list to review   Cm will follow

## 2018-04-23 NOTE — PROGRESS NOTES
NEPHROLOGY PROGRESS NOTE   Chun Marcelino 74 y o  female MRN: [id number]  Unit/Bed#: [facility] 808-01 Encounter: [id number]  Reason for Consult: ESRD MWF    ASSESSMENT and PLAN:    78-year-old female with a past medical history of ESRD on MWF, polycystic kidney disease, anemia, former smoker for 40 years who presents on 04/20 with weakness and fatigue for several weeks progressively worsening, and was sent in from dialysis due to hypotension to 80 systolic  Patient has had recent weight loss of 20 lb  1) ESRD - MWF at [address]    - access - LUE AVF  - K 5 2 this AM - received HD today    2) weakness/fatigue/weight loss - etiology unclear, but may be multifactorial    - Palliative care team on board  - mirtazapine was started along with dexamethasone    3) anemia    - EGD/colonoscopy at [facility] with hemorrhoids, rectal ulcer, antral gastritis, non erosive duodenitis   - on AYLEEN - monitor if concern for malignancy  - received pRBC  - CT of C/A/P without evidence of malignancy  - SPEP - pending  - Hematology on board  - GI on board - plan for colonoscopy on tuesday    4) hypotension    - calcium channel blocker was discontinued    5) 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    6) thrombocytopenia    7) cystic disease    - GI monitoring liver cysts    8) lactic acid slightly elevated yest - unclear if due to liver issue vs less likely infectious    SUBJECTIVE / INTERVAL HISTORY:    Patient seen in the dialysis unit but dialysis at completed  Patient denies complaints  UF was essentially 0  Blood pressure is        OBJECTIVE:  Current Weight: Weight - Scale: 43 3 kg (95 lb 8 oz)  Vitals:    04/23/18 1000 04/23/18 1030 04/23/18 1100 04/23/18 1114   BP: (!) 89/26 (!) 85/32 (!) 137/25 (!) 108/39   BP Location:    Right arm   Pulse: 93 65 97 98   Resp:    18   Temp:       TempSrc:       SpO2:       Weight:       Height:           Intake/Output Summary (Last 24 hours) at 04/23/18 1145  Last data filed at 04/23/18 1114   Gross per 24 hour   Intake             1165 ml   Output              523 ml   Net              642 ml     General: NAD, cachectic  Skin: no rash  Eyes: anicteric sclera  ENT: moist mucous membrane  Neck: supple  Chest: CTA b/l  CVS: s1s2  Abdomen: soft, nontender  Extremities: no edema  : no rock  Neuro: AAOX3  Psych: normal affect      Medications:    Current Facility-Administered Medications:     acetaminophen (TYLENOL) tablet 650 mg, 650 mg, Oral, Q6H PRN, Johann Caro PA-C    calcium carbonate (TUMS) chewable tablet 1,000 mg, 1,000 mg, Oral, Daily PRN, Johann Caro PA-C    cholecalciferol (VITAMIN D3) tablet 1,000 Units, 1,000 Units, Oral, Daily, Johann Caro PA-C, 1,000 Units at 04/22/18 0915    cinacalcet (SENSIPAR) tablet 30 mg, 30 mg, Oral, Daily With Breakfast, Johann Caro PA-C, 30 mg at 04/22/18 0915    dexamethasone (DECADRON) tablet 2 mg, 2 mg, Oral, Daily, Ale Plaza MD, 2 mg at 04/22/18 1435    epoetin beth (EPOGEN,PROCRIT) injection 4,000 Units, 4,000 Units, Intravenous, After Dialysis, 4,000 Units at 04/23/18 1039 **AND** epoetin beth (EPOGEN,PROCRIT) injection 3,000 Units, 3,000 Units, Intravenous, After Dialysis, Darryn Mayen DO, 3,000 Units at 04/23/18 1039    heparin (porcine) injection 2,000 Units, 2,000 Units, Intravenous, PRN, Darryn Mayen DO    heparin (porcine) subcutaneous injection 5,000 Units, 5,000 Units, Subcutaneous, Q8H [address], 5,000 Units at 04/23/18 0606 **AND** Platelet count, , , Once, Johann Caro PA-C    mirtazapine (REMERON) tablet 15 mg, 15 mg, Oral, HS, Ale Plaza MD, 15 mg at 04/22/18 2125    ondansetron (ZOFRAN) injection 4 mg, 4 mg, Intravenous, Q6H PRN, oJhann Caro PA-C, 4 mg at 04/23/18 1045    pantoprazole (PROTONIX) EC tablet 40 mg, 40 mg, Oral, Early Morning, Johann Caro PA-C, 40 mg at 04/23/18 0607    polyethylene glycol (MIRALAX) packet 17 g, 17 g, Oral, Daily PRN, Sotero Rangel Nestor Jim PA-C    pravastatin (PRAVACHOL) tablet 40 mg, 40 mg, Oral, Daily With Jada Fowler PA-C, 40 mg at 04/22/18 1648    sevelamer (RENAGEL) tablet 800 mg, 800 mg, Oral, TID With Meals, Adolphus Boas, PA-C, 800 mg at 04/22/18 1648    Laboratory Results:    Results from last 7 days  Lab Units 04/23/18  0807 04/22/18  0452 04/21/18  1348 04/20/18  1453 04/18/18  1030   WBC Thousand/uL 9 48 5 83  --  6 80  --    HEMOGLOBIN g/dL 8 9* 8 4*  --  7 6* 7 4*   HEMATOCRIT % 28 5* 26 6*  --  24 3*  --    PLATELETS Thousands/uL 124* 71* 72* 102*  --    SODIUM mmol/L 133* 136  --  135*  --    POTASSIUM mmol/L 5 2 4 4  --  4 1  --    CHLORIDE mmol/L 99* 100  --  99*  --    CO2 mmol/L 28 28  --  28  --    BUN mg/dL 38* 20  --  20  --    CREATININE mg/dL 4 09* 2 87*  --  2 93*  --    CALCIUM mg/dL 8 9 9 0  --  9 1  --    MAGNESIUM mg/dL 2 0 2 0  --   --   --    PHOSPHORUS mg/dL 4 8* 3 6  --   --   --    TOTAL PROTEIN g/dL  --   --   --  6 7  --    GLUCOSE RANDOM mg/dL 94 79  --  102  --

## 2018-04-23 NOTE — PROGRESS NOTES
Patient refused celiac/mesenteric duplex today due to nausea/vomiting  States she would be ok to go another day that is not dialysis days as she frequently get nauseous during treatment  I spoke with Dr Jaimee Schmitt from [facility] and she is aware  A message was left with vascular to reschedule testing  Will inform oncoming nurse

## 2018-04-23 NOTE — PHYSICAL THERAPY NOTE
Physical Therapy Cancellation Note    Consult received, chart reviewed  As per RN pt is not appropriate for therapy services today  Will continue to follow as appropriate       Katlyn Brower, PT

## 2018-04-23 NOTE — ASSESSMENT & PLAN NOTE
Malnutrition Findings:   Malnutrition type: Chronic illness  Degree of Malnutrition: Other severe protein calorie malnutrition (<75% energy intake versus needs for > 1 month resulting in 6 5#(6 4%) wt loss in past 2 months exhibiting hollow orbital look and hollowing temples and protruding clavicle; Treating with Ensure Enlive TID;)    BMI Findings:  BMI Classifications: Underweight < 18 5     Body mass index is 15 65 kg/m²     Continue with prednisone, mirtazapine, monitor p o  intake, nutritionist following

## 2018-04-23 NOTE — PROGRESS NOTES
Progress Note - Diane Lynch 74 y o  female MRN: [id number]    Unit/Bed#: [facility] 808-01 Encounter: [id number]    Subjective:     Patient seen and examined at bedside  She had nausea and vomiting after dialysis today  She denies abdominal pain  She did not drink the bowel preparation yesterday  She is still having solid bowel movements  Objective:     Vitals: Blood pressure (!) 108/39, pulse 98, temperature (!) 97 °F (36 1 °C), temperature source Tympanic, resp  rate 18, height 5' 5 5" (1 664 m), weight 43 3 kg (95 lb 8 oz), SpO2 95 %  ,Body mass index is 15 65 kg/m²        Intake/Output Summary (Last 24 hours) at 04/23/18 1519  Last data filed at 04/23/18 1430   Gross per 24 hour   Intake             1180 ml   Output              523 ml   Net              657 ml       Physical Exam:     General Appearance: Alert, frail cachectic female, appears stated age and cooperative  Lungs: Clear to auscultation bilaterally, no rales or rhonchi, no labored breathing/accessory muscle use  Heart: Regular rate and rhythm, S1, S2 normal, no murmur, click, rub or gallop  Abdomen: Soft, non-tender, non-distended; bowel sounds normal; no masses or no organomegaly  Extremities: No cyanosis, edema    Invasive Devices     Peripheral Intravenous Line            Peripheral IV 04/20/18 Right Forearm 3 days          Line            Hemodialysis AV Fistula Left -- days                Lab Results:    Results from last 7 days  Lab Units 04/23/18  0807   WBC Thousand/uL 9 48   HEMOGLOBIN g/dL 8 9*   HEMATOCRIT % 28 5*   PLATELETS Thousands/uL 124*   NEUTROS PCT % 89*   LYMPHS PCT % 7*   MONOS PCT % 4   EOS PCT % 0       Results from last 7 days  Lab Units 04/23/18  0807  04/20/18  1453   SODIUM mmol/L 133*  < > 135*   POTASSIUM mmol/L 5 2  < > 4 1   CHLORIDE mmol/L 99*  < > 99*   CO2 mmol/L 28  < > 28   BUN mg/dL 38*  < > 20   CREATININE mg/dL 4 09*  < > 2 93*   CALCIUM mg/dL 8 9  < > 9 1   TOTAL PROTEIN g/dL  --   --  6 7   BILIRUBIN TOTAL mg/dL  --   --  1 18*   ALK PHOS U/L  --   --  121*   ALT U/L  --   --  7*   AST U/L  --   --  18   GLUCOSE RANDOM mg/dL 94  < > 102   < > = values in this interval not displayed  Imaging Studies: I have personally reviewed pertinent imaging studies  Ct Chest Abdomen Pelvis Wo Contrast    Result Date: 4/21/2018  Impression: Severe emphysema  There is no evidence of pulmonary malignancy  Markedly enlarged kidneys with innumerable cysts, consistent with autosomal dominant polycystic kidney disease  There are also innumerable liver cysts in a severely enlarged liver, many rim calcified  No definite evidence of malignancy identified in the abdomen and pelvis, but please note that detection is limited without IV contrast Moderate ascites in the abdomen and pelvis  Workstation performed: [id number]     Xr Chest 2 Views    Result Date: 4/20/2018  Impression: No acute cardiopulmonary disease  Workstation performed: [id number]       Assessment and Plan:    Discussed plan with Dr Erin Nicole with [facility]    1) Iron deficiency anemia: Hemoglobin has been stable at 8 4  No signs of overt bleeding  Recent colonoscopy at [facility] revealed a rectal ulcer, possibly due to ischemia   Due to poor bowel prep at that time, repeat colonoscopy is recommended to rule out underlying lesions and malignancy       -Patient refuses to drink Golytely bowel prep and declines colonoscopy--she expressed understanding of the risks of missing a colon cancer  -Recommend iron supplementation  -She may have regular diet as tolerated    2)Severe protein calorie malnutrition:     -Continue prednisone, mirtazapine  -Monitor PO intake  -Appreciate input from palliative care and nutrition     3) Innumerable amount of cysts in the liver: Patient reports that she was told that she has polycystic liver disease      -Do not appear symptomatic at this time

## 2018-04-23 NOTE — ASSESSMENT & PLAN NOTE
· Acute on chronic anemia, possibly related to kidney disease versus other causes  · Earlier this year underwent EGD/colonscopy at [facility]: Colonoscopy with large internal and external hemorrhoids and small non-bleeding rectal ulcer, EGD with antral gastritis, non erosive duodenitis and hiatal hernia  · Hematology evaluated the patient, sent iron panel, I have added erythropoietin level  · CT chest abdomen and pelvis negative for malignant  · Component of very poor nutritional status  · Status post 1 PRBC on April 21st  · Hemoglobin today 8 9, patient feels better after blood transfusion  · Patient was found to have a rectal ulcer on previous colonoscopy at [facility], this lesion was not biopsied secondary to poor preparation at that time, she was scheduled to have another colonoscopy tomorrow with our GI team, but this time she is refusing as she is unable to drink preparation    Will await for further GI recommendation if an alternative can be given to the patient  · Daughter was informed that patient is refusing colonoscopy

## 2018-04-23 NOTE — CASE MANAGEMENT
Initial Clinical Review    Admission: Date/Time/Statement: 4/20/18 @ 1547   Admit ANGELIA Berrios 3/14/2018 - 3/16/2018    Orders Placed This Encounter   Procedures    Inpatient Admission (expected length of stay for this patient is greater than two midnights)     Standing Status:   Standing     Number of Occurrences:   1     Order Specific Question:   Admitting Physician     Answer:   Alma Russ     Order Specific Question:   Level of Care     Answer:   Med Surg [16]     Order Specific Question:   Estimated length of stay     Answer:   More than 2 Midnights     Order Specific Question:   Certification     Answer:   I certify that inpatient services are medically necessary for this patient for a duration of greater than two midnights  See H&P and MD Progress Notes for additional information about the patient's course of treatment  ED: Date/Time/Mode of Arrival:   ED Arrival Information     Expected Arrival Acuity Means of Arrival Escorted By Service Admission Type    - 4/20/2018 13:40 Emergent Ambulance [facility] General Medicine Emergency    Arrival Complaint    weakness          Chief Complaint:   Chief Complaint   Patient presents with    Weakness - Generalized     pt sent from dialysis where she was lethergic and weak  also sent for low H&H       History of Illness: 74 y o  female with a history of ESRD secondary to polycystic kidney disease and anemia who presents with weakness and fatigue  Weakness and fatigue have been ongoing for several weeks, however have been worsening  Today, patient overslept for dialysis  Her son came to pick her up and brought her to dialysis  He states she has stop and rest several times while trying to get dialysis due to fatigue and weakness  He also notes she gets short of breath when she walks  During dialysis, patient's systolic blood pressure was in the 80s    Patient was sent here for evaluation and family is concerned she is unable to care for herself at home anymore  She lives at home by herself      Patient was supposed to get outpatient blood transfusion tomorrow arranged by her nephrologist for symptomatic anemia  She also had an upcoming appointment for evaluation by an oncologist due to anemia  She has lost around 20 lb  Her son states that she does eat well when she is with him but has difficulty preparing meals for herself at home  Reports vomiting during dialysis sessions  She is also newly incontinent of stool  Denies fever, chills, night sweats  No recent medication changes  No focal weakness, numbness, or tingling      Patient had two recent admissions for weakness/fatigue at [facility] and was found to have anemia requiring transfusions  She had a colonoscopy which showed hemorrhoids and nonbleeding rectal ulcer  She also had an EGD which showed gastritis and duodenitis  CT of chest abdomen pelvis showed mild ascites, cholelithiasis, diverticulosis, and innumerable bilateral renal and hepatic cysts  ED Vital Signs:   ED Triage Vitals   Temperature Pulse Respirations Blood Pressure SpO2   04/20/18 1356 04/20/18 1346 04/20/18 1346 04/20/18 1346 04/20/18 1356   (!) 97 4 °F (36 3 °C) 98 18 139/77 94 %      Temp Source Heart Rate Source Patient Position - Orthostatic VS BP Location FiO2 (%)   04/20/18 1356 04/20/18 1346 04/20/18 1430 04/20/18 1529 --   Oral Monitor Lying Right arm       Pain Score       04/20/18 1635       No Pain        Wt Readings from Last 1 Encounters:   04/20/18 43 3 kg (95 lb 8 oz)       Vital Signs (abnormal): Low temperature 97 4  Abnormal Labs/Diagnostic Test Results:   Wbc 6 80, hgb 7 6, hct 24 3  Platelets 880  Na 135  Cl 99  Bun 20  Creatinine 2 93  Alt 7  Alkaline phosphatase 121  Albumin 1 8  Total bilirubin 1 18  Troponin 0 08    4/21- ct chest - Severe emphysema  Zhang Faustin is no evidence of pulmonary malignancy    Markedly enlarged kidneys with innumerable cysts, consistent with autosomal dominant polycystic kidney disease  There are also innumerable liver cysts in a severely enlarged liver, many rim calcified  No definite evidence of malignancy identified in the abdomen and pelvis, but please note that detection is limited without IV contrast  Moderate ascites in the abdomen and pelvis    ED Treatment:   Medication Administration from 04/20/2018 1340 to 04/20/2018 1633     None          Past Medical/Surgical History:    Active Ambulatory Problems     Diagnosis Date Noted    Atrial fibrillation ([place] [address] ) 11/14/2013    End stage renal disease ([place] [address] ) 08/08/2013    Hemodialysis patient ([place] [address] ) 11/14/2013    Essential hypertension 11/14/2013    Polycystic kidney 11/14/2013    Hyperlipidemia 02/27/2018    Anemia, normocytic normochromic 02/27/2018    Osteoporosis 02/21/2017    Dyslipidemia 01/07/2016    Ambulatory dysfunction 03/14/2018     Resolved Ambulatory Problems     Diagnosis Date Noted    No Resolved Ambulatory Problems     Past Medical History:   Diagnosis Date    Chronic kidney disease     Dyslipidemia        Admitting Diagnosis: Weakness [R53 1]  ESRD (end stage renal disease) ([place] [address] ) [N18 6]    Age/Sex: 74 y o  female    Assessment/Plan:   * Weakness   Assessment & Plan     · Weakness and failure to thrive- no longer safe to take care of herself at home  · PT/OT  · Consult with case mgmt  · Check TSH          Symptomatic anemia   Assessment & Plan     · Was due to outpatient blood transfusion tomorrow  · Patient would benefit from transfusion at this point given symptoms- ideally could coordinate this with dialysis  · Also was referred to hematologist to r/o hematologic disorder contributing- will obtain heme/onc consult  · Earlier this year underwent EGD/colonscopy at [facility]: Colonoscopy with large internal and external hemorrhoids and small non-bleeding rectal ulcer, EGD with antral gastritis, non erosive duodenitis and hiatal hernia          Elevated troponin   Assessment & Plan     · Troponin mildly elevated- no EKG changes or chest pain  · Trend          Weight loss   Assessment & Plan     · Patient with significant weight loss, appears cachectic  BMI 16 14  · Nutrition evaluation  · Recent colonoscopy and CT c/a/p done at [facility] not revealing of cause             End stage renal disease ([facility] [address] )   Assessment & Plan     · Dialysis MWF  · Having issues with vomiting and hypotension during dialysis  · Nephrology consult for management         Admission Orders: 4/20/2018  1547 INPATIENT   Scheduled Meds:   Current Facility-Administered Medications:  acetaminophen 650 mg Oral Q6H PRN Cinthia Bence, PA-C   calcium carbonate 1,000 mg Oral Daily PRN Cinthia Bence, PA-C   cholecalciferol 1,000 Units Oral Daily Cinthia Bence, PA-C   cinacalcet 30 mg Oral Daily With Breakfast Cinthia Bence, PA-C   dexamethasone 2 mg Oral Daily Alondra Villarreal MD   epoetin beth 4,000 Units Intravenous After Dialysis Juan Paredes DO   And       epoetin beth 3,000 Units Intravenous After Dialysis Franc Mayen DO   heparin (porcine) 2,000 Units Intravenous PRN Franc Mayen DO   heparin (porcine) 5,000 Units Subcutaneous [facility] Cinthia Bence, PA-C   mirtazapine 15 mg Oral HS Alondra Villarreal MD   ondansetron 4 mg Intravenous Q6H PRN Cinthia Bence, PA-C   pantoprazole 40 mg Oral Early Morning Cinthia Bence, PA-C   polyethylene glycol 17 g Oral Daily PRN Cinthia Bence, PA-C   pravastatin 40 mg Oral Daily With [facility]   sevelamer 800 mg Oral TID With Meals Cinthia Bence, PA-C     Continuous Infusions:    PRN Meds:     Ondansetron 4 mg iv - used x 1      scds  Clears  Consult GI; palliative care; hematology; nephrology  Hemodialysis MWF  PT/OT  Unit prbc 4/21    Per oncology: Regarding her anemia, I would obtain iron study, G44 and folic acid    Since she has relatively wide gap between total protein and albumin, I think it is reasonable to test SPEP to rule out monoclonal protein  She was smoker for 40 years  Although, chest x-ray she is was negative, I think it is reasonable to obtain CT scan of chest abdomen pelvis without IV contrast to rule out malignancy, since she has 20 lb weight loss  She is in agreement with my recommendations  · Per nephrology: CT scan of the chest abdomen pelvis without evidence of malignancy  · 1 unit PRBC with hemodialysis today  · Limited ultrafiltration given patient's blood pressure  · Discontinue calcium channel blocker  Hematology oncology following    Per GI- Iron deficiency anemia:  She was found to have a rectal ulcer but a poorly prepped colon during her recent colonoscopy at [facility]   Because of her iron deficiency anemia I want to make sure she has a well prepped complete colonoscopy to rule out malignancy or other bleeding lesions  It would also be an opportunity to reassess and re-biopsy this rectal ulcer  We will begin a two day bowel preparation for colonoscopy on Tuesday      Liver cysts:  She has chronically had multiple liver cysts but these do not appear to be symptomatic

## 2018-04-23 NOTE — PROGRESS NOTES
Progress note - Palliative and Supportive Care   Kellie Moss 74 y o  female [id number]    Assessment:  ESRD on HD  HTN  Anemia related to kidney disease  Cachexia  Anorexia  Nausea and vomiting this am    Plan:  1  Symptom management - Just started dexamethasone and mirtazapine yesterday, no changes yet as expected  Will continue to follow with you    -     2  Goals - recent change to Level 2   -    Code Status: Level 2 - DNAR: but accepts endotracheal intubation    Reason for visit: follow up anorexia    Interval history:  73 y/o female with ESRD on HD-seen in HD-Nurse in HD states pt had nausea and dry heaving down here and received ondansetron for that  Not hungry, didn't eat bfst   Looks sad  No chest or abdominal pain at this time       MEDICATIONS / ALLERGIES:    all current active meds have been reviewed and current meds:   Current Facility-Administered Medications   Medication Dose Route Frequency    acetaminophen (TYLENOL) tablet 650 mg  650 mg Oral Q6H PRN    calcium carbonate (TUMS) chewable tablet 1,000 mg  1,000 mg Oral Daily PRN    cholecalciferol (VITAMIN D3) tablet 1,000 Units  1,000 Units Oral Daily    cinacalcet (SENSIPAR) tablet 30 mg  30 mg Oral Daily With Breakfast    dexamethasone (DECADRON) tablet 2 mg  2 mg Oral Daily    epoetin beth (EPOGEN,PROCRIT) injection 4,000 Units  4,000 Units Intravenous After Dialysis    And    epoetin beth (EPOGEN,PROCRIT) injection 3,000 Units  3,000 Units Intravenous After Dialysis    heparin (porcine) injection 2,000 Units  2,000 Units Intravenous PRN    heparin (porcine) subcutaneous injection 5,000 Units  5,000 Units Subcutaneous Q8H [facility]    mirtazapine (REMERON) tablet 15 mg  15 mg Oral HS    ondansetron (ZOFRAN) injection 4 mg  4 mg Intravenous Q6H PRN    pantoprazole (PROTONIX) EC tablet 40 mg  40 mg Oral Early Morning    polyethylene glycol (MIRALAX) packet 17 g  17 g Oral Daily PRN    pravastatin (PRAVACHOL) tablet 40 mg  40 mg Oral Daily With Dinner    sevelamer (RENAGEL) tablet 800 mg  800 mg Oral TID With Meals       No Known Allergies    OBJECTIVE:    Physical Exam    Vitals:    04/23/18 1000 04/23/18 1030 04/23/18 1100 04/23/18 1114   BP: (!) 89/26 (!) 85/32 (!) 137/25 (!) 108/39   BP Location:    Right arm   Pulse: 93 65 97 98   Resp:    18   Temp:       TempSrc:       SpO2:       Weight:       Height:           Intake/Output Summary (Last 24 hours) at 04/23/18 1131  Last data filed at 04/23/18 1114   Gross per 24 hour   Intake              865 ml   Output              523 ml   Net              342 ml       Physical Exam   Constitutional: She appears cachectic  She appears ill  HENT:   Head: Normocephalic  Right Ear: Hearing and external ear normal  No drainage  Left Ear: Hearing and external ear normal  No drainage  Nose: No rhinorrhea  Mouth/Throat: Mucous membranes are dry and not cyanotic  Eyes: Conjunctivae, EOM and lids are normal    Neck: No tracheal deviation present  Cardiovascular: Normal rate  Pulmonary/Chest: Effort normal  No stridor  Musculoskeletal: She exhibits no edema  Neurological: She is alert  Skin: Skin is dry  Hands cool       Lab Results:   I have personally reviewed pertinent labs  , CBC:   Lab Results   Component Value Date    WBC 9 48 04/23/2018    HGB 8 9 (L) 04/23/2018    HCT 28 5 (L) 04/23/2018    MCV 90 04/23/2018     (L) 04/23/2018    MCH 28 0 04/23/2018    MCHC 31 2 (L) 04/23/2018    RDW 17 7 (H) 04/23/2018    MPV 13 1 (H) 04/23/2018    NRBC 0 04/23/2018   , CMP:   Lab Results   Component Value Date     (L) 04/23/2018    K 5 2 04/23/2018    CL 99 (L) 04/23/2018    CO2 28 04/23/2018    ANIONGAP 6 04/23/2018    BUN 38 (H) 04/23/2018    CREATININE 4 09 (H) 04/23/2018    GLUCOSE 94 04/23/2018    CALCIUM 8 9 04/23/2018    EGFR 10 04/23/2018     Thank you kindly for allowing us to help provide care for your patient  Naren Paradadavid Berman MD  [facility] Palliative and Supportive Care  [phone]    ** Please Note: This note may be constructed using a voice recognition dictation system   **

## 2018-04-24 ENCOUNTER — APPOINTMENT (INPATIENT)
Dept: NON INVASIVE DIAGNOSTICS | Facility: HOSPITAL | Age: 73
DRG: 682 | End: 2018-04-24
Payer: MEDICARE

## 2018-04-24 LAB
ALBUMIN SERPL ELPH-MCNC: 1.89 G/DL (ref 3.5–5)
ALBUMIN SERPL ELPH-MCNC: 37.8 % (ref 52–65)
ALPHA1 GLOB SERPL ELPH-MCNC: 0.66 G/DL (ref 0.1–0.4)
ALPHA1 GLOB SERPL ELPH-MCNC: 13.2 % (ref 2.5–5)
ALPHA2 GLOB SERPL ELPH-MCNC: 0.63 G/DL (ref 0.4–1.2)
ALPHA2 GLOB SERPL ELPH-MCNC: 12.6 % (ref 7–13)
ANION GAP SERPL CALCULATED.3IONS-SCNC: 8 MMOL/L (ref 4–13)
BASOPHILS # BLD AUTO: 0 THOUSANDS/ΜL (ref 0–0.1)
BASOPHILS NFR BLD AUTO: 0 % (ref 0–1)
BETA GLOB ABNORMAL SERPL ELPH-MCNC: 0.23 G/DL (ref 0.4–0.8)
BETA1 GLOB SERPL ELPH-MCNC: 4.6 % (ref 5–13)
BETA2 GLOB SERPL ELPH-MCNC: 6.3 % (ref 2–8)
BETA2+GAMMA GLOB SERPL ELPH-MCNC: 0.32 G/DL (ref 0.2–0.5)
BUN SERPL-MCNC: 24 MG/DL (ref 5–25)
CALCIUM SERPL-MCNC: 9.2 MG/DL (ref 8.3–10.1)
CHLORIDE SERPL-SCNC: 97 MMOL/L (ref 100–108)
CO2 SERPL-SCNC: 30 MMOL/L (ref 21–32)
CREAT SERPL-MCNC: 2.82 MG/DL (ref 0.6–1.3)
EOSINOPHIL # BLD AUTO: 0.01 THOUSAND/ΜL (ref 0–0.61)
EOSINOPHIL NFR BLD AUTO: 0 % (ref 0–6)
EPO SERPL-ACNC: 189 MIU/ML (ref 2.6–18.5)
ERYTHROCYTE [DISTWIDTH] IN BLOOD BY AUTOMATED COUNT: 17.5 % (ref 11.6–15.1)
GAMMA GLOB ABNORMAL SERPL ELPH-MCNC: 1.28 G/DL (ref 0.5–1.6)
GAMMA GLOB SERPL ELPH-MCNC: 25.5 % (ref 12–22)
GFR SERPL CREATININE-BSD FRML MDRD: 16 ML/MIN/1.73SQ M
GLUCOSE SERPL-MCNC: 81 MG/DL (ref 65–140)
HCT VFR BLD AUTO: 26.1 % (ref 34.8–46.1)
HGB BLD-MCNC: 8.4 G/DL (ref 11.5–15.4)
IGG/ALB SER: 0.61 {RATIO} (ref 1.1–1.8)
INTERPRETATION UR IFE-IMP: NORMAL
LYMPHOCYTES # BLD AUTO: 0.43 THOUSANDS/ΜL (ref 0.6–4.47)
LYMPHOCYTES NFR BLD AUTO: 6 % (ref 14–44)
MAGNESIUM SERPL-MCNC: 2 MG/DL (ref 1.6–2.6)
MCH RBC QN AUTO: 28.7 PG (ref 26.8–34.3)
MCHC RBC AUTO-ENTMCNC: 32.2 G/DL (ref 31.4–37.4)
MCV RBC AUTO: 89 FL (ref 82–98)
MONOCYTES # BLD AUTO: 0.41 THOUSAND/ΜL (ref 0.17–1.22)
MONOCYTES NFR BLD AUTO: 6 % (ref 4–12)
NEUTROPHILS # BLD AUTO: 6.26 THOUSANDS/ΜL (ref 1.85–7.62)
NEUTS SEG NFR BLD AUTO: 88 % (ref 43–75)
NRBC BLD AUTO-RTO: 0 /100 WBCS
PHOSPHATE SERPL-MCNC: 3.8 MG/DL (ref 2.3–4.1)
PLATELET # BLD AUTO: 83 THOUSANDS/UL (ref 149–390)
PMV BLD AUTO: 12.9 FL (ref 8.9–12.7)
POTASSIUM SERPL-SCNC: 4.2 MMOL/L (ref 3.5–5.3)
PROT SERPL-MCNC: 5 G/DL (ref 6.4–8.2)
RBC # BLD AUTO: 2.93 MILLION/UL (ref 3.81–5.12)
SODIUM SERPL-SCNC: 135 MMOL/L (ref 136–145)
WBC # BLD AUTO: 7.14 THOUSAND/UL (ref 4.31–10.16)

## 2018-04-24 PROCEDURE — 99232 SBSQ HOSP IP/OBS MODERATE 35: CPT | Performed by: NURSE PRACTITIONER

## 2018-04-24 PROCEDURE — 99232 SBSQ HOSP IP/OBS MODERATE 35: CPT | Performed by: INTERNAL MEDICINE

## 2018-04-24 PROCEDURE — 84100 ASSAY OF PHOSPHORUS: CPT | Performed by: INTERNAL MEDICINE

## 2018-04-24 PROCEDURE — 83735 ASSAY OF MAGNESIUM: CPT | Performed by: INTERNAL MEDICINE

## 2018-04-24 PROCEDURE — 80048 BASIC METABOLIC PNL TOTAL CA: CPT | Performed by: INTERNAL MEDICINE

## 2018-04-24 PROCEDURE — 93975 VASCULAR STUDY: CPT

## 2018-04-24 PROCEDURE — 85025 COMPLETE CBC W/AUTO DIFF WBC: CPT | Performed by: INTERNAL MEDICINE

## 2018-04-24 PROCEDURE — 93975 VASCULAR STUDY: CPT | Performed by: SURGERY

## 2018-04-24 RX ORDER — ONDANSETRON 4 MG/1
8 TABLET, ORALLY DISINTEGRATING ORAL 3 TIMES WEEKLY
Status: DISCONTINUED | OUTPATIENT
Start: 2018-04-25 | End: 2018-04-25

## 2018-04-24 RX ORDER — ALBUMIN (HUMAN) 12.5 G/50ML
25 SOLUTION INTRAVENOUS ONCE
Status: COMPLETED | OUTPATIENT
Start: 2018-04-24 | End: 2018-04-24

## 2018-04-24 RX ADMIN — HEPARIN SODIUM 5000 UNITS: 5000 INJECTION, SOLUTION INTRAVENOUS; SUBCUTANEOUS at 00:38

## 2018-04-24 RX ADMIN — PANTOPRAZOLE SODIUM 40 MG: 40 TABLET, DELAYED RELEASE ORAL at 05:19

## 2018-04-24 RX ADMIN — VITAMIN D, TAB 1000IU (100/BT) 1000 UNITS: 25 TAB at 09:42

## 2018-04-24 RX ADMIN — RENAGEL 800 MG: 800 TABLET ORAL at 12:05

## 2018-04-24 RX ADMIN — PRAVASTATIN SODIUM 40 MG: 40 TABLET ORAL at 17:21

## 2018-04-24 RX ADMIN — HEPARIN SODIUM 5000 UNITS: 5000 INJECTION, SOLUTION INTRAVENOUS; SUBCUTANEOUS at 05:18

## 2018-04-24 RX ADMIN — RENAGEL 800 MG: 800 TABLET ORAL at 17:21

## 2018-04-24 RX ADMIN — MIRTAZAPINE 15 MG: 15 TABLET, FILM COATED ORAL at 00:38

## 2018-04-24 RX ADMIN — HEPARIN SODIUM 5000 UNITS: 5000 INJECTION, SOLUTION INTRAVENOUS; SUBCUTANEOUS at 22:12

## 2018-04-24 RX ADMIN — RENAGEL 800 MG: 800 TABLET ORAL at 09:42

## 2018-04-24 RX ADMIN — CINACALCET HYDROCHLORIDE 30 MG: 30 TABLET, COATED ORAL at 09:42

## 2018-04-24 RX ADMIN — HEPARIN SODIUM 5000 UNITS: 5000 INJECTION, SOLUTION INTRAVENOUS; SUBCUTANEOUS at 15:09

## 2018-04-24 RX ADMIN — MIRTAZAPINE 15 MG: 15 TABLET, FILM COATED ORAL at 22:12

## 2018-04-24 RX ADMIN — ALBUMIN HUMAN 25 G: 0.25 SOLUTION INTRAVENOUS at 17:23

## 2018-04-24 RX ADMIN — DEXAMETHASONE 2 MG: 2 TABLET ORAL at 09:42

## 2018-04-24 NOTE — PROGRESS NOTES
Progress Note - Palliative & Supportive Care  Richard Garza  74 y o   female  PPHP 808/PPHP 808-01   MRN: [id number]  Encounter: [id number]     Assessment  Patient Active Problem List   Diagnosis    [facility] ([facility] [address] [facility]    End stage renal disease ([facility] [address] )    [facility]    Essential hypertension    Polycystic kidney    Hyperlipidemia    Anemia, normocytic normochromic    Weakness    Weight loss    Symptomatic anemia    Elevated troponin    Osteoporosis    Dyslipidemia    Ambulatory dysfunction    Severe protein-calorie malnutrition (HCC)       Plan:  Symptom Management: poor appetite, nausea and vomiting   - dexamethasone 2mg PO daily - short-term use   - mirtazapine 15mg q HS   - add ondansetron 8mg ODT on MWF before dialysis     Patient is welcome to follow up in palliative care clinic after discharge  Goals of Care:    - not addressed today; plan for ongoing disease directed care and further work-up of symptoms; however does not want colonoscopy  Level 2 DNR but accepts intubation short-term; see palliative care consult note      History  Patient appears fatigued  Napping but easily awakes to voice  States appetite is slightly improved from admission  Sleeping well  Son at bedside; states she ate more today than meals last week  No nausea today  Has had increasing nausea associated with dialysis over past few months  N/v tends to be worst during dialysis sessions        Meds  all current active meds have been reviewed and current meds:   Current Facility-Administered Medications   Medication Dose Route Frequency    acetaminophen (TYLENOL) tablet 650 mg  650 mg Oral Q6H PRN    calcium carbonate (TUMS) chewable tablet 1,000 mg  1,000 mg Oral Daily PRN    cholecalciferol (VITAMIN D3) tablet 1,000 Units  1,000 Units Oral Daily    cinacalcet (SENSIPAR) tablet 30 mg  30 mg Oral Daily With Breakfast    dexamethasone (DECADRON) tablet 2 mg  2 mg Oral Daily    epoetin beth (EPOGEN,PROCRIT) injection 4,000 Units  4,000 Units Intravenous After Dialysis    And    epoetin beth (EPOGEN,PROCRIT) injection 3,000 Units  3,000 Units Intravenous After Dialysis    heparin (porcine) injection 2,000 Units  2,000 Units Intravenous PRN    heparin (porcine) subcutaneous injection 5,000 Units  5,000 Units Subcutaneous Q8H [address]    mirtazapine (REMERON) tablet 15 mg  15 mg Oral HS    ondansetron (ZOFRAN) injection 4 mg  4 mg Intravenous Q6H PRN    [START ON 4/25/2018] ondansetron (ZOFRAN-ODT) dispersible tablet 8 mg  8 mg Oral Once per day on Mon Wed Fri    pantoprazole (PROTONIX) EC tablet 40 mg  40 mg Oral Early Morning    polyethylene glycol (MIRALAX) packet 17 g  17 g Oral Daily PRN    pravastatin (PRAVACHOL) tablet 40 mg  40 mg Oral Daily With Dinner    sevelamer (RENAGEL) tablet 800 mg  800 mg Oral TID With Meals       No Known Allergies    Objective  Physical Exam   Constitutional: She is oriented to person, place, and time  She is sleeping  She is easily aroused  Appears chronically ill; no acute distress   Pulmonary/Chest: Effort normal    Neurological: She is oriented to person, place, and time and easily aroused  Skin: Skin is warm and dry  Psychiatric: Cognition and memory are normal  She exhibits a depressed mood  Slightly flat affect       Lab Results: I have personally reviewed pertinent labs        Leanna Lawrence [facility]  Office number: [phone]

## 2018-04-24 NOTE — PROGRESS NOTES
NEPHROLOGY PROGRESS NOTE   Parris Bhardwaj 74 y o  female MRN: [id number]  Unit/Bed#: [facility] 808-01 Encounter: [id number]  Reason for Consult: ESRD MWF    ASSESSMENT and PLAN:    25-year-old female with a past medical history of ESRD on MWF, polycystic kidney disease, anemia, former smoker for 40 years who presents on 04/20 with weakness and fatigue for several weeks progressively worsening, and was sent in from dialysis due to hypotension to 80 systolic  Patient has had recent weight loss of 20 lb     1) ESRD - MWF at [address]     - access - LUE AVF  - Next HD 4/25  - electrolytes stable today     2) weakness/fatigue/weight loss - etiology unclear, but may be multifactorial     - Palliative care team on board  - mirtazapine was started along with dexamethasone  - further eval per Primary Team  - abd vascular study pending     3) anemia     - EGD/colonoscopy at [facility] with hemorrhoids, rectal ulcer, antral gastritis, non erosive duodenitis   - on AYLEEN - monitor if concern for malignancy  - received pRBC  - CT of C/A/P without evidence of malignancy  - SPEP - faint possible band, LUIS neg  - Hematology on board - EPO level elevated  - GI on board - plan for colonoscopy on tuesday     4) hypotension     - calcium channel blocker was discontinued     5) 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    - check PTH  If suppressed, can hold sensipar     6) thrombocytopenia     7) cystic disease     - GI monitoring liver cysts     8) lactic acid slightly elevated - unclear if due to liver issue vs less likely infectious    SUBJECTIVE / INTERVAL HISTORY:    Pt states eating more today  Finished 1/4 of tray today   SBP     OBJECTIVE:  Current Weight: Weight - Scale: 43 3 kg (95 lb 8 oz)  Vitals:    04/23/18 1500 04/23/18 2300 04/24/18 0500 04/24/18 0755   BP: 92/56 (!) 83/47 107/50 106/51   BP Location: Right arm Right arm  Right arm   Pulse: 99 101 100 98   Resp: 18 18  16   Temp: 98 °F (36 7 °C) 98 7 °F (37 1 °C)  97 9 °F (36 6 °C) TempSrc: Oral Oral  Oral   SpO2: 94% 96%  96%   Weight:       Height:           Intake/Output Summary (Last 24 hours) at 04/24/18 1256  Last data filed at 04/23/18 1800   Gross per 24 hour   Intake                0 ml   Output                0 ml   Net                0 ml     General: NAD, cachectic  Skin: no rash  Eyes: anicteric sclera  ENT: moist mucous membrane  Neck: supple  Chest: CTA b/l  CVS: s1s2  Abdomen: soft, nontender; hepatomegaly on exam  Extremities: no edema  : no rock  Neuro: AAOX3  Psych: normal affect    Good thrill/bruit on fistula LUE    Medications:    Current Facility-Administered Medications:     acetaminophen (TYLENOL) tablet 650 mg, 650 mg, Oral, Q6H PRN, Ara Estes PA-C    calcium carbonate (TUMS) chewable tablet 1,000 mg, 1,000 mg, Oral, Daily PRN, Ara Estes PA-C    cholecalciferol (VITAMIN D3) tablet 1,000 Units, 1,000 Units, Oral, Daily, Ara Estes PA-C, 1,000 Units at 04/24/18 9187    cinacalcet (SENSIPAR) tablet 30 mg, 30 mg, Oral, Daily With Breakfast, Ara Estes PA-C, 30 mg at 04/24/18 1408    dexamethasone (DECADRON) tablet 2 mg, 2 mg, Oral, Daily, Anna Garcia MD, 2 mg at 04/24/18 7270    epoetin beth (EPOGEN,PROCRIT) injection 4,000 Units, 4,000 Units, Intravenous, After Dialysis, 4,000 Units at 04/23/18 1039 **AND** epoetin beth (EPOGEN,PROCRIT) injection 3,000 Units, 3,000 Units, Intravenous, After Dialysis, Philipp Mayen DO, 3,000 Units at 04/23/18 1039    heparin (porcine) injection 2,000 Units, 2,000 Units, Intravenous, PRN, Philipp Mayen DO    heparin (porcine) subcutaneous injection 5,000 Units, 5,000 Units, Subcutaneous, Q8H [facility] halfway, 5,000 Units at 04/24/18 0518 **AND** Platelet count, , , Once, Ara Estes PA-C    mirtazapine (REMERON) tablet 15 mg, 15 mg, Oral, HS, Anna Garcia MD, 15 mg at 04/24/18 0038    ondansetron (ZOFRAN) injection 4 mg, 4 mg, Intravenous, Q6H PRN, Harrison Edwards JACY Thornton, 4 mg at 04/23/18 1045    pantoprazole (PROTONIX) EC tablet 40 mg, 40 mg, Oral, Early Morning, Bonnie Alicea PA-C, 40 mg at 04/24/18 4442    polyethylene glycol (MIRALAX) packet 17 g, 17 g, Oral, Daily PRN, Bonnie Alicea PA-C    pravastatin (PRAVACHOL) tablet 40 mg, 40 mg, Oral, Daily With Alben Claritza, JACY, 40 mg at 04/22/18 1648    sevelamer (RENAGEL) tablet 800 mg, 800 mg, Oral, TID With Meals, Bonnie Alicea PA-C, 800 mg at 04/24/18 1205    Laboratory Results:    Results from last 7 days  Lab Units 04/24/18  0458 04/23/18  0807 04/22/18  0452 04/21/18  1348 04/20/18  1453 04/18/18  1030   WBC Thousand/uL 7 14 9 48 5 83  --  6 80  --    HEMOGLOBIN g/dL 8 4* 8 9* 8 4*  --  7 6* 7 4*   HEMATOCRIT % 26 1* 28 5* 26 6*  --  24 3*  --    PLATELETS Thousands/uL 83* 124* 71* 72* 102*  --    SODIUM mmol/L 135* 133* 136  --  135*  --    POTASSIUM mmol/L 4 2 5 2 4 4  --  4 1  --    CHLORIDE mmol/L 97* 99* 100  --  99*  --    CO2 mmol/L 30 28 28  --  28  --    BUN mg/dL 24 38* 20  --  20  --    CREATININE mg/dL 2 82* 4 09* 2 87*  --  2 93*  --    CALCIUM mg/dL 9 2 8 9 9 0  --  9 1  --    MAGNESIUM mg/dL 2 0 2 0 2 0  --   --   --    PHOSPHORUS mg/dL 3 8 4 8* 3 6  --   --   --    TOTAL PROTEIN g/dL  --   --   --  5 0* 6 7  --    GLUCOSE RANDOM mg/dL 81 94 79  --  102  --

## 2018-04-24 NOTE — CASE MANAGEMENT
Continued Stay Review    Date: 4/24/18    Vital Signs: /51 (BP Location: Right arm)   Pulse 98   Temp 97 9 °F (36 6 °C) (Oral)   Resp 16   Ht 5' 5 5" (1 664 m)   Wt 43 3 kg (95 lb 8 oz)   SpO2 96%   BMI 15 65 kg/m²     Medications:   Scheduled Meds:   Current Facility-Administered Medications:  acetaminophen 650 mg Oral Q6H PRN Liss ROSA Drake-CLAUDIA   calcium carbonate 1,000 mg Oral Daily PRN Liss Phenshefali, PA-CLAUDIA   cholecalciferol 1,000 Units Oral Daily Liss PhenJACY meehan   cinacalcet 30 mg Oral Daily With Breakfast Liss JACY Drake   dexamethasone 2 mg Oral Daily Jayde Saldaña MD   epoetin beth 4,000 Units Intravenous After Dialysis Babar Quinteros DO   And       epoetin beth 3,000 Units Intravenous After Dialysis Nadia Mayen DO   heparin (porcine) 2,000 Units Intravenous PRN Nadia Mayen DO   heparin (porcine) 5,000 Units Subcutaneous [facility] Liss Drake PA-C   mirtazapine 15 mg Oral HS Jayde Saldaña MD   ondansetron 4 mg Intravenous Q6H PRN Liss JACY Drake   pantoprazole 40 mg Oral Early Morning Liss JACY Drake   polyethylene glycol 17 g Oral Daily PRN Liss JACY Drake   pravastatin 40 mg Oral Daily With Isaiah Cole PA-C   sevelamer 800 mg Oral TID With Meals Liss Drake PA-C     Continuous Infusions:    PRN Meds:   acetaminophen    calcium carbonate    epoetin beth **AND** epoetin beth    heparin (porcine)    ondansetron    polyethylene glycol    Abnormal Labs/Diagnostic Results:   Na 135  Cl 97  Creat 2 82  H/H 8 4/26 1  Platelets 83    Age/Sex: 74 y o  female     Assessment/Plan:   4/24 Nephrology Progress Note  64-year-old female with a past medical history of ESRD on MWF, polycystic kidney disease, anemia, former smoker for 40 years who presents on 04/20 with weakness and fatigue for several weeks progressively worsening, and was sent in from dialysis due to hypotension to 80 systolic   Patient has had recent weight loss of 20 lb     1) ESRD - MWF at [address]     - access - LUE AVF  - Next HD 4/25  - electrolytes stable today     2) weakness/fatigue/weight loss - etiology unclear, but may be multifactorial     - Palliative care team on board  - mirtazapine was started along with dexamethasone  - further eval per Primary Team  - abd vascular study pending     3) anemia     - EGD/colonoscopy at [facility] with hemorrhoids, rectal ulcer, antral gastritis, non erosive duodenitis   - on AYLEEN - monitor if concern for malignancy  - received pRBC  - CT of C/A/P without evidence of malignancy  - SPEP - faint possible band, LUIS neg  - Hematology on board - EPO level elevated  - GI on board - plan for colonoscopy on tuesday     4) hypotension     - calcium channel blocker was discontinued     5) 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     - check PTH   If suppressed, can hold sensipar     6) thrombocytopenia     7) cystic disease     - GI monitoring liver cysts     8) lactic acid slightly elevated - unclear if due to liver issue vs less likely infectious       Discharge Plan: SNF

## 2018-04-24 NOTE — SOCIAL WORK
MCG Guide Used for Initial Round: ESRD  Optimal GLOS: 3  Hospital Day: 4 days  DC Readiness: Return to top of Renal Failure, Chronic RRG - ISC  · Discharge readiness is indicated by patient meeting Recovery Milestones, including ALL of the following:  ? Hemodynamic stability  ? Hypoxemia absent  ? Tachypnea absent  ? Pulmonary edema absent or acceptable for next level of care  ? Electrolyte and acid-base status at baseline or acceptable for next level of care  ? Mental status at baseline  ? Nausea and vomiting absent or controlled and acceptable for next level of care  ? Dialysis not needed or access and plan established  ? Ambulatory  ? Oral hydration, medications, and diet  ?  Discharge plans and education understood    Identified Barriers: HD , palliative ,, poor nutrition  Refusing colonoscopy snf placement   Discussion Date (Time): 04/24/18 with  Dr Brian Ramos

## 2018-04-24 NOTE — SOCIAL WORK
Cm reviewed patient  Patient not stable for discharge today  Patient for HD today  [place] reviewing and requesting information  Patient and son stated patient has received 2 blood transfusions in the past 2 months (2 at [facility] and 1 at [facility])  Patient receives dialysis at [facility] and used [facility] for transportation  Family contacts are Rafael lraios [phone] and Juan Simeon [phone]  Cm informed [place] of this information  Cm continues to follow and work on patient's discharge plan

## 2018-04-24 NOTE — ASSESSMENT & PLAN NOTE
Patient was evaluated by palliative care today, agreeable to try mirtazapine and low-dose steroid for the treatment of her poor appetite and likely underlying depression/anxiety  Continue with Ensure  Monitor p o  intake closely  Nutritionist following  Colonoscopy as above  For mesenteric artery Doppler done today, did not significant abnormalities

## 2018-04-24 NOTE — SOCIAL WORK
CSWS consulted to set up an escalated team meeting requested by patient's family  CSWS spoke with patient's daughter, Sumi Harvey ([phone]) who stated her and patient's son's Benuel Journey) availability for a meeting is tomorrow, 4/25/18 before 3 pm or Friday, 4/27/18  CSWS will speak with the following disciplines (SLIM, nephrology, GI and palliative care) on 4/25/18 to schedule a meeting

## 2018-04-24 NOTE — PHYSICAL THERAPY NOTE
Physical Therapy Cancellation Note  Orders for PT eval received and chart review completed  Pt resting/ sleeping- and declining PT at this time- Note that escalated team meting re: plan of care and goals af care is scheduled for tomorrow  Will follow and complete PT eval as medically appropriate and pending pt/ family wishes     Kendall Eubanks, PT

## 2018-04-24 NOTE — ASSESSMENT & PLAN NOTE
· Multifactorial, likely related to very poor nutritional status with weight loss as well as symptomatic anemia  · Work up of malignancy is pending  · PT and OT to evaluate patient  · Treat underlying causes

## 2018-04-24 NOTE — ASSESSMENT & PLAN NOTE
· Acute on chronic anemia, possibly related to kidney disease versus other causes  · Earlier this year underwent EGD/colonscopy at [facility]: Colonoscopy with large internal and external hemorrhoids and small non-bleeding rectal ulcer, EGD with antral gastritis, non erosive duodenitis and hiatal hernia  · Hematology evaluated the patient, sent iron panel, I have added erythropoietin level  · CT chest abdomen and pelvis negative for malignant  · Component of very poor nutritional status  · Status post 1 PRBC on April 21st  · Hemoglobin today 8 9, patient feels better after blood transfusion  · Patient was found to have a rectal ulcer on previous colonoscopy at [facility], this lesion was not biopsied secondary to poor preparation at that time, she was scheduled to have another colonoscopy tomorrow with our GI team, but this time she is refusing as she is unable to drink preparation    Will await for further GI recommendation if an alternative can be given to the patient  · Daughter was informed that patient is refusing colonoscopy  · She wants to have family meeting with palliative care regarding goals of care

## 2018-04-24 NOTE — PROGRESS NOTES
04/24/18 1100   Plan of Care   Comments  introduces self and begins to establish a caring relationship through which the pt is helped   provides a safe space for the pt to express her thoughts and feelings     Assessment Completed by: Unit visit

## 2018-04-24 NOTE — PROGRESS NOTES
Progress Note - Valerie Garcia 1945, 74 y o  female MRN: [id number]    [facility] Encounter: [id number]    Primary Care Provider: Mitch Kuhn MD   Date and time admitted to hospital: 4/20/2018  1:40 PM        Severe protein-calorie malnutrition ([address] )   Assessment & Plan    Malnutrition Findings:   Malnutrition type: Chronic illness  Degree of Malnutrition: Other severe protein calorie malnutrition (<75% energy intake versus needs for > 1 month resulting in 6 5#(6 4%) wt loss in past 2 months exhibiting hollow orbital look and hollowing temples and protruding clavicle; Treating with Ensure Enlive TID;)    BMI Findings:  BMI Classifications: Underweight < 18 5     Body mass index is 15 65 kg/m²     Continue with prednisone, mirtazapine, monitor p o  intake, nutritionist following          Elevated troponin   Assessment & Plan    · Troponin mildly elevated- no EKG changes or chest pain  · Trend is flat, likely related to end-stage renal disease  · Patient has no symptoms of chest pain, shortness of breath or any other angina equivalents at this time  · Possible component related to type 2 secondary to severe anemia  · No further intervention indicated at this time  · Monitor clinically        Symptomatic anemia   Assessment & Plan    · Acute on chronic anemia, possibly related to kidney disease versus other causes  · Earlier this year underwent EGD/colonscopy at [facility]: Colonoscopy with large internal and external hemorrhoids and small non-bleeding rectal ulcer, EGD with antral gastritis, non erosive duodenitis and hiatal hernia  · Hematology evaluated the patient, sent iron panel, I have added erythropoietin level  · CT chest abdomen and pelvis negative for malignant  · Component of very poor nutritional status  · Status post 1 PRBC on April 21st  · Hemoglobin today 8 9, patient feels better after blood transfusion  · Patient was found to have a rectal ulcer on previous colonoscopy at [facility], this lesion was not biopsied secondary to poor preparation at that time, she was scheduled to have another colonoscopy tomorrow with our GI team, but this time she is refusing as she is unable to drink preparation  Will await for further GI recommendation if an alternative can be given to the patient  · Daughter was informed that patient is refusing colonoscopy  · She wants to have family meeting with palliative care regarding goals of care        Weight loss   Assessment & Plan    Patient was evaluated by palliative care today, agreeable to try mirtazapine and low-dose steroid for the treatment of her poor appetite and likely underlying depression/anxiety  Continue with Ensure  Monitor p o  intake closely  Nutritionist following  Colonoscopy as above  For mesenteric artery Doppler done today, did not significant abnormalities        End stage renal disease ([facility] [address] )   Assessment & Plan    · Dialysis MWF  · Nephrology following  · Dialysis today, vomiting and nausea after dialysis, continue with Zofran p r n  and other supportive care for now        * Weakness   Assessment & Plan    · Multifactorial, likely related to very poor nutritional status with weight loss as well as symptomatic anemia  · Work up of malignancy is pending  · PT and OT to evaluate patient  · Treat underlying causes               VTE Pharmacologic Prophylaxis:   Pharmacologic: Heparin  Mechanical VTE Prophylaxis in Place: Yes    Patient Centered Rounds: I have performed bedside rounds with nursing staff today  Discussions with Specialists or Other Care Team Provider: palliative care, GI, nephrology    Education and Discussions with Family / Patient: patient    Time Spent for Care: 45 minutes  More than 50% of total time spent on counseling and coordination of care as described above      Current Length of Stay: 4 day(s)    Current Patient Status: Inpatient   Certification Statement: The patient will continue to require additional inpatient hospital stay due to poor nutritional status and malignancy work up    Discharge Plan: will have a family meeting this week for Aba Alejo    Code Status: Level 2 - DNAR: but accepts endotracheal intubation      Subjective:   I am doing well  I have improved apatite today  I am working on it  Objective:     Vitals:   Temp (24hrs), Av °F (36 7 °C), Min:97 5 °F (36 4 °C), Max:98 7 °F (37 1 °C)    HR:  [] 99  Resp:  [16-18] 16  BP: ()/(47-56) 90/56  SpO2:  [94 %-96 %] 94 %  Body mass index is 15 65 kg/m²  Input and Output Summary (last 24 hours): Intake/Output Summary (Last 24 hours) at 18 1820  Last data filed at 18 1801   Gross per 24 hour   Intake              280 ml   Output                0 ml   Net              280 ml       Physical Exam:     Physical Exam   Constitutional: She is oriented to person, place, and time  She appears cachectic  She is cooperative  HENT:   Head: Normocephalic and atraumatic  Right Ear: External ear normal    Left Ear: External ear normal    Nose: Nose normal    Mouth/Throat: Oropharynx is clear and moist  No oropharyngeal exudate  Eyes: Conjunctivae and EOM are normal  Pupils are equal, round, and reactive to light  Right eye exhibits no discharge  Left eye exhibits no discharge  No scleral icterus  Neck: Neck supple  No JVD present  No tracheal deviation present  No thyromegaly present  Cardiovascular: Normal rate and regular rhythm  Exam reveals no gallop and no friction rub  No murmur heard  Pulmonary/Chest: Effort normal and breath sounds normal  No stridor  No respiratory distress  She has no wheezes  She has no rales  She exhibits no tenderness  Abdominal: Soft  Bowel sounds are normal  She exhibits no distension and no mass  There is no tenderness  There is no rebound and no guarding  Musculoskeletal: Normal range of motion  She exhibits no edema, tenderness or deformity     Lymphadenopathy:     She has no cervical adenopathy  Neurological: She is alert and oriented to person, place, and time  She has normal reflexes  She displays normal reflexes  No cranial nerve deficit  She exhibits normal muscle tone  Coordination normal    Skin: Skin is warm and dry  No rash noted  No erythema  No pallor  Psychiatric: She has a normal mood and affect  Her behavior is normal  Judgment and thought content normal    Nursing note and vitals reviewed  Additional Data:     Labs:      Results from last 7 days  Lab Units 04/24/18  0458   WBC Thousand/uL 7 14   HEMOGLOBIN g/dL 8 4*   HEMATOCRIT % 26 1*   PLATELETS Thousands/uL 83*   NEUTROS PCT % 88*   LYMPHS PCT % 6*   MONOS PCT % 6   EOS PCT % 0       Results from last 7 days  Lab Units 04/24/18  0458  04/21/18  1348 04/20/18  1453   SODIUM mmol/L 135*  < >  --  135*   POTASSIUM mmol/L 4 2  < >  --  4 1   CHLORIDE mmol/L 97*  < >  --  99*   CO2 mmol/L 30  < >  --  28   BUN mg/dL 24  < >  --  20   CREATININE mg/dL 2 82*  < >  --  2 93*   CALCIUM mg/dL 9 2  < >  --  9 1   TOTAL PROTEIN g/dL  --   --  5 0* 6 7   BILIRUBIN TOTAL mg/dL  --   --   --  1 18*   ALK PHOS U/L  --   --   --  121*   ALT U/L  --   --   --  7*   AST U/L  --   --   --  18   GLUCOSE RANDOM mg/dL 81  < >  --  102   < > = values in this interval not displayed  * I Have Reviewed All Lab Data Listed Above  * Additional Pertinent Lab Tests Reviewed:  [facility]    Imaging:    Imaging Reports Reviewed Today Include:    Imaging Personally Reviewed by Myself Includes:       Recent Cultures (last 7 days):           Last 24 Hours Medication List:     Current Facility-Administered Medications:  acetaminophen 650 mg Oral Q6H PRN Martin Dole, PA-C   calcium carbonate 1,000 mg Oral Daily PRN Martin Dole, PA-C   cholecalciferol 1,000 Units Oral Daily Martin Dole, PA-C   cinacalcet 30 mg Oral Daily With Breakfast Martin Dole, PA-C   dexamethasone 2 mg Oral Daily Gerry Art MD   epoetin beth 4,000 Units Intravenous After Dialysis Moe Vega,    And       epoetin beth 3,000 Units Intravenous After Dialysis Aure Mayen,    heparin (porcine) 2,000 Units Intravenous PRN Aure Mayen,    heparin (porcine) 5,000 Units Subcutaneous [facility] Shea Harris PA-C   mirtazapine 15 mg Oral HS Gerry Art MD   ondansetron 4 mg Intravenous Q6H PRN Shea Harris PA-C   [START ON 4/25/2018] ondansetron 8 mg Oral Once per day on Mon Wed Fri YSABEL Elias   pantoprazole 40 mg Oral Early Morning Shea Harris PA-C   polyethylene glycol 17 g Oral Daily PRN Shea Harris PA-C   pravastatin 40 mg Oral Daily With [employer]ACY   sevelamer 800 mg Oral TID With Meals Shea Harris PA-C        Today, Patient Was Seen By: Natalie Parada MD    ** Please Note: Dictation voice to text software may have been used in the creation of this document   **

## 2018-04-25 ENCOUNTER — APPOINTMENT (INPATIENT)
Dept: DIALYSIS | Facility: HOSPITAL | Age: 73
DRG: 682 | End: 2018-04-25
Attending: INTERNAL MEDICINE
Payer: MEDICARE

## 2018-04-25 LAB
ANION GAP SERPL CALCULATED.3IONS-SCNC: 9 MMOL/L (ref 4–13)
BASOPHILS # BLD AUTO: 0 THOUSANDS/ΜL (ref 0–0.1)
BASOPHILS NFR BLD AUTO: 0 % (ref 0–1)
BUN SERPL-MCNC: 40 MG/DL (ref 5–25)
CALCIUM SERPL-MCNC: 8.9 MG/DL (ref 8.3–10.1)
CHLORIDE SERPL-SCNC: 98 MMOL/L (ref 100–108)
CO2 SERPL-SCNC: 28 MMOL/L (ref 21–32)
CREAT SERPL-MCNC: 3.85 MG/DL (ref 0.6–1.3)
EOSINOPHIL # BLD AUTO: 0 THOUSAND/ΜL (ref 0–0.61)
EOSINOPHIL NFR BLD AUTO: 0 % (ref 0–6)
ERYTHROCYTE [DISTWIDTH] IN BLOOD BY AUTOMATED COUNT: 17.5 % (ref 11.6–15.1)
GFR SERPL CREATININE-BSD FRML MDRD: 11 ML/MIN/1.73SQ M
GLUCOSE SERPL-MCNC: 81 MG/DL (ref 65–140)
HCT VFR BLD AUTO: 25.2 % (ref 34.8–46.1)
HGB BLD-MCNC: 7.7 G/DL (ref 11.5–15.4)
LYMPHOCYTES # BLD AUTO: 0.52 THOUSANDS/ΜL (ref 0.6–4.47)
LYMPHOCYTES NFR BLD AUTO: 9 % (ref 14–44)
MCH RBC QN AUTO: 27.7 PG (ref 26.8–34.3)
MCHC RBC AUTO-ENTMCNC: 30.6 G/DL (ref 31.4–37.4)
MCV RBC AUTO: 91 FL (ref 82–98)
MONOCYTES # BLD AUTO: 0.25 THOUSAND/ΜL (ref 0.17–1.22)
MONOCYTES NFR BLD AUTO: 4 % (ref 4–12)
NEUTROPHILS # BLD AUTO: 4.86 THOUSANDS/ΜL (ref 1.85–7.62)
NEUTS SEG NFR BLD AUTO: 87 % (ref 43–75)
NRBC BLD AUTO-RTO: 0 /100 WBCS
PLATELET # BLD AUTO: 75 THOUSANDS/UL (ref 149–390)
PMV BLD AUTO: 13.1 FL (ref 8.9–12.7)
POTASSIUM SERPL-SCNC: 4.4 MMOL/L (ref 3.5–5.3)
PTH-INTACT SERPL-MCNC: 235.6 PG/ML (ref 18.4–80.1)
RBC # BLD AUTO: 2.78 MILLION/UL (ref 3.81–5.12)
SODIUM SERPL-SCNC: 135 MMOL/L (ref 136–145)
WBC # BLD AUTO: 5.64 THOUSAND/UL (ref 4.31–10.16)

## 2018-04-25 PROCEDURE — 90935 HEMODIALYSIS ONE EVALUATION: CPT | Performed by: INTERNAL MEDICINE

## 2018-04-25 PROCEDURE — 85025 COMPLETE CBC W/AUTO DIFF WBC: CPT | Performed by: INTERNAL MEDICINE

## 2018-04-25 PROCEDURE — 80048 BASIC METABOLIC PNL TOTAL CA: CPT | Performed by: INTERNAL MEDICINE

## 2018-04-25 PROCEDURE — 99232 SBSQ HOSP IP/OBS MODERATE 35: CPT | Performed by: INTERNAL MEDICINE

## 2018-04-25 PROCEDURE — 83970 ASSAY OF PARATHORMONE: CPT | Performed by: INTERNAL MEDICINE

## 2018-04-25 RX ORDER — ONDANSETRON 4 MG/1
8 TABLET, ORALLY DISINTEGRATING ORAL EVERY 8 HOURS SCHEDULED
Status: DISCONTINUED | OUTPATIENT
Start: 2018-04-25 | End: 2018-04-27

## 2018-04-25 RX ADMIN — ONDANSETRON 8 MG: 4 TABLET, ORALLY DISINTEGRATING ORAL at 06:37

## 2018-04-25 RX ADMIN — MIRTAZAPINE 15 MG: 15 TABLET, FILM COATED ORAL at 21:52

## 2018-04-25 RX ADMIN — CINACALCET HYDROCHLORIDE 30 MG: 30 TABLET, COATED ORAL at 12:07

## 2018-04-25 RX ADMIN — PANTOPRAZOLE SODIUM 40 MG: 40 TABLET, DELAYED RELEASE ORAL at 06:37

## 2018-04-25 RX ADMIN — RENAGEL 800 MG: 800 TABLET ORAL at 12:07

## 2018-04-25 RX ADMIN — RENAGEL 800 MG: 800 TABLET ORAL at 15:37

## 2018-04-25 RX ADMIN — DEXAMETHASONE 2 MG: 2 TABLET ORAL at 12:07

## 2018-04-25 RX ADMIN — HEPARIN SODIUM 5000 UNITS: 5000 INJECTION, SOLUTION INTRAVENOUS; SUBCUTANEOUS at 15:37

## 2018-04-25 RX ADMIN — HEPARIN SODIUM 5000 UNITS: 5000 INJECTION, SOLUTION INTRAVENOUS; SUBCUTANEOUS at 06:37

## 2018-04-25 RX ADMIN — ONDANSETRON 8 MG: 4 TABLET, ORALLY DISINTEGRATING ORAL at 21:52

## 2018-04-25 RX ADMIN — HEPARIN SODIUM 5000 UNITS: 5000 INJECTION, SOLUTION INTRAVENOUS; SUBCUTANEOUS at 21:52

## 2018-04-25 RX ADMIN — VITAMIN D, TAB 1000IU (100/BT) 1000 UNITS: 25 TAB at 12:07

## 2018-04-25 RX ADMIN — EPOETIN ALFA 3000 UNITS: 3000 SOLUTION INTRAVENOUS; SUBCUTANEOUS at 09:38

## 2018-04-25 RX ADMIN — EPOETIN ALFA 4000 UNITS: 4000 SOLUTION INTRAVENOUS; SUBCUTANEOUS at 09:39

## 2018-04-25 RX ADMIN — ONDANSETRON 8 MG: 4 TABLET, ORALLY DISINTEGRATING ORAL at 15:37

## 2018-04-25 RX ADMIN — PRAVASTATIN SODIUM 40 MG: 40 TABLET ORAL at 15:37

## 2018-04-25 NOTE — ASSESSMENT & PLAN NOTE
· Acute on chronic anemia, possibly related to kidney disease versus other causes  · Earlier this year underwent EGD/colonscopy at [facility]: Colonoscopy with large internal and external hemorrhoids and small non-bleeding rectal ulcer, EGD with antral gastritis, non erosive duodenitis and hiatal hernia  · Hematology evaluated the patient, sent iron panel, I have added erythropoietin level  · CT chest abdomen and pelvis negative for malignant  · Component of very poor nutritional status  · Status post 1 PRBC on April 21st  · Hemoglobin today 8 9, patient feels better after blood transfusion  · Patient was found to have a rectal ulcer on previous colonoscopy at [facility], this lesion was not biopsied secondary to poor preparation at that time, she was scheduled to have another colonoscopy tomorrow with our GI team, but this time she is refusing as she is unable to drink preparation    Will await for further GI recommendation if an alternative can be given to the patient  · Daughter was informed that patient is refusing colonoscopy  · She wants to have family meeting with palliative care regarding goals of care Friday morning

## 2018-04-25 NOTE — ASSESSMENT & PLAN NOTE
· Troponin mildly elevated- no EKG changes or chest pain  · Trend is flat, likely related to end-stage renal disease  · Patient has no symptoms of chest pain, shortness of breath or any other angina equivalents at this time  · Possible component related to type 2 secondary to severe anemia  · No further intervention indicated at this time

## 2018-04-25 NOTE — NUTRITION
04/25/18 1538   Nutrition Prognosis   Nutrition Considerations Other (Comment); Motivation  (Reinforced nutrient dense foods and supplement use to increase daily calorie/protein intake with patient and son  )   Recommendations/Interventions   Summary Patient's po intake remains minimal 0-25% meal completions noted  Patient occasionally taking ensure clear supplements  Patient is agreeable to ensure enlive and diet to be liberalized secondary to poor po intake  Escalated team meeting 4/27 per chart review  Interventions Diet: order adjustment;Supplement adjust  (Secondary to poor po intake diet liberalized to Regular with Ensure enlive TID  Patient's son reports loose dentures, fixodent provided  Patient denies need for texture modification at this time  Monitor electrolytes, phosphorus, adjust accordingly   )   Nutrition Recommendations Continue diet as ordered; Other (specify); Lab - consider order (specify)  (If aggressive measures are to be taken sugg initiate EN to provide 75% nutr needs  Rec: cyclic tube feeding from 7p-7a of Nepro @ a goal rate of 55 ml/hr with 40 ml free h2o flush every hr while EN running (1188 kcal, 53 gms pro, 958 ml tv)  )

## 2018-04-25 NOTE — SOCIAL WORK
CSWS spoke with the following disciplines (SLIM, nephrology, GI and palliative care) on 4/25/18 and family to schedule an escalated team meeting for Friday, 4/27/18 at 9 am      DAVIN Amaya LM aware

## 2018-04-25 NOTE — PLAN OF CARE

## 2018-04-25 NOTE — ASSESSMENT & PLAN NOTE
Patient was evaluated by palliative care today, agreeable to try mirtazapine and low-dose steroid for the treatment of her poor appetite and likely underlying depression/anxiety  Continue with Ensure  Monitor p o  intake closely  Started on metrozapine  Colonoscopy still refusing   For mesenteric artery Doppler done today, did not significant abnormalities

## 2018-04-25 NOTE — PROGRESS NOTES
Rounded with Laureano Numbers, spoke about having podiatry come and clip pt's toenails  Pt currently at HD  Will continue to monitor

## 2018-04-25 NOTE — PROGRESS NOTES
Patient not seen, but chart reviewed  Last meal intake recorded was lunch 4/23  Emesis post intake, one dose of ondansetron given  None since then  Has ondansetron scheduled for just mornings dialysis days  Will trial increase to 4 mg q8h  Thank you kindly for allowing us to help provide care for your patient  Jevon Salty Rockwell MD  [facility]  [phone]    ** Please Note: This note may be constructed using a voice recognition dictation system   **

## 2018-04-25 NOTE — PROGRESS NOTES
Progress Note - Frankey Dibble 1945, 74 y o  female MRN: [id number]    [facility] Encounter: [id number]    Primary Care Provider: Temitope Garcia MD   Date and time admitted to hospital: 4/20/2018  1:40 PM        Severe protein-calorie malnutrition ([address] )   Assessment & Plan    Malnutrition Findings:   Malnutrition type: Chronic illness  Degree of Malnutrition: Other severe protein calorie malnutrition (<75% energy intake versus needs for > 1 month resulting in 6 5#(6 4%) wt loss in past 2 months exhibiting hollow orbital look and hollowing temples and protruding clavicle; Treating with Ensure Enlive TID;)    BMI Findings:  BMI Classifications: Underweight < 18 5     Body mass index is 15 65 kg/m²     Continue with prednisone, mirtazapine, monitor p o  intake, nutritionist following          Elevated troponin   Assessment & Plan    · Troponin mildly elevated- no EKG changes or chest pain  · Trend is flat, likely related to end-stage renal disease  · Patient has no symptoms of chest pain, shortness of breath or any other angina equivalents at this time  · Possible component related to type 2 secondary to severe anemia  · No further intervention indicated at this time          Symptomatic anemia   Assessment & Plan    · Acute on chronic anemia, possibly related to kidney disease versus other causes  · Earlier this year underwent EGD/colonscopy at [facility]: Colonoscopy with large internal and external hemorrhoids and small non-bleeding rectal ulcer, EGD with antral gastritis, non erosive duodenitis and hiatal hernia  · Hematology evaluated the patient, sent iron panel, I have added erythropoietin level  · CT chest abdomen and pelvis negative for malignant  · Component of very poor nutritional status  · Status post 1 PRBC on April 21st  · Hemoglobin today 8 9, patient feels better after blood transfusion  · Patient was found to have a rectal ulcer on previous colonoscopy at [facility], this lesion was not biopsied secondary to poor preparation at that time, she was scheduled to have another colonoscopy tomorrow with our GI team, but this time she is refusing as she is unable to drink preparation  Will await for further GI recommendation if an alternative can be given to the patient  · Daughter was informed that patient is refusing colonoscopy  · She wants to have family meeting with palliative care regarding goals of care        Weight loss   Assessment & Plan    Patient was evaluated by palliative care today, agreeable to try mirtazapine and low-dose steroid for the treatment of her poor appetite and likely underlying depression/anxiety  Continue with Ensure  Monitor p o  intake closely  Nutritionist following  Colonoscopy still refusing   For mesenteric artery Doppler done today, did not significant abnormalities        End stage renal disease ([facility] [address] )   Assessment & Plan    · Dialysis MWF  · Nephrology following  · Dialysis today feeling weak all over        * Weakness   Assessment & Plan    · Multifactorial, likely related to very poor nutritional status with weight loss as well as symptomatic anemia  · Work up of malignancy is pending  · PT and OT to evaluate patient  · Treat underlying causes               VTE Pharmacologic Prophylaxis:   Pharmacologic: Heparin  Mechanical VTE Prophylaxis in Place: Yes    Patient Centered Rounds: I have performed bedside rounds with nursing staff today  Discussions with Specialists or Other Care Team Provider: palliative care, GI, nephrology    Education and Discussions with Family / Patient: patient    Time Spent for Care: 45 minutes  More than 50% of total time spent on counseling and coordination of care as described above      Current Length of Stay: 5 day(s)    Current Patient Status: Inpatient   Certification Statement: The patient will continue to require additional inpatient hospital stay due to poor nutritional status and malignancy work up    Discharge Plan: will have a family meeting this week for Aba Alejo    Code Status: Level 2 - DNAR: but accepts endotracheal intubation      Subjective:   I am doing well  I have improved apatite today  I am working on it  Objective:     Vitals:   Temp (24hrs), Av °F (36 7 °C), Min:96 8 °F (36 °C), Max:99 6 °F (37 6 °C)    HR:  [] 103  Resp:  [16] 16  BP: ()/(35-57) 110/56  SpO2:  [92 %-93 %] 93 %  Body mass index is 15 65 kg/m²  Input and Output Summary (last 24 hours): Intake/Output Summary (Last 24 hours) at 18 1653  Last data filed at 18 1431   Gross per 24 hour   Intake             1000 ml   Output              500 ml   Net              500 ml       Physical Exam:     Physical Exam   Constitutional: She is oriented to person, place, and time  She appears cachectic  She is cooperative  HENT:   Head: Normocephalic and atraumatic  Right Ear: External ear normal    Left Ear: External ear normal    Nose: Nose normal    Mouth/Throat: Oropharynx is clear and moist  No oropharyngeal exudate  Eyes: Conjunctivae and EOM are normal  Pupils are equal, round, and reactive to light  Right eye exhibits no discharge  Left eye exhibits no discharge  No scleral icterus  Neck: Neck supple  No JVD present  No tracheal deviation present  No thyromegaly present  Cardiovascular: Normal rate and regular rhythm  Exam reveals no gallop and no friction rub  No murmur heard  Pulmonary/Chest: Effort normal and breath sounds normal  No stridor  No respiratory distress  She has no wheezes  She has no rales  She exhibits no tenderness  Abdominal: Soft  Bowel sounds are normal  She exhibits no distension and no mass  There is no tenderness  There is no rebound and no guarding  Musculoskeletal: Normal range of motion  She exhibits no edema, tenderness or deformity  Lymphadenopathy:     She has no cervical adenopathy  Neurological: She is alert and oriented to person, place, and time  She has normal reflexes  No cranial nerve deficit  She exhibits normal muscle tone  Coordination normal    Skin: Skin is warm and dry  No rash noted  No erythema  No pallor  Psychiatric: She has a normal mood and affect  Her behavior is normal  Judgment and thought content normal    Nursing note and vitals reviewed  Additional Data:     Labs:      Results from last 7 days  Lab Units 04/25/18  0518   WBC Thousand/uL 5 64   HEMOGLOBIN g/dL 7 7*   HEMATOCRIT % 25 2*   PLATELETS Thousands/uL 75*   NEUTROS PCT % 87*   LYMPHS PCT % 9*   MONOS PCT % 4   EOS PCT % 0       Results from last 7 days  Lab Units 04/25/18  0519  04/21/18  1348 04/20/18  1453   SODIUM mmol/L 135*  < >  --  135*   POTASSIUM mmol/L 4 4  < >  --  4 1   CHLORIDE mmol/L 98*  < >  --  99*   CO2 mmol/L 28  < >  --  28   BUN mg/dL 40*  < >  --  20   CREATININE mg/dL 3 85*  < >  --  2 93*   CALCIUM mg/dL 8 9  < >  --  9 1   TOTAL PROTEIN g/dL  --   --  5 0* 6 7   BILIRUBIN TOTAL mg/dL  --   --   --  1 18*   ALK PHOS U/L  --   --   --  121*   ALT U/L  --   --   --  7*   AST U/L  --   --   --  18   GLUCOSE RANDOM mg/dL 81  < >  --  102   < > = values in this interval not displayed  * I Have Reviewed All Lab Data Listed Above  * Additional Pertinent Lab Tests Reviewed:  [facility]    Imaging:    Imaging Reports Reviewed Today Include:    Imaging Personally Reviewed by Myself Includes:       Recent Cultures (last 7 days):           Last 24 Hours Medication List:     Current Facility-Administered Medications:  acetaminophen 650 mg Oral Q6H PRN Maple Purdue, PA-C   calcium carbonate 1,000 mg Oral Daily PRN Maple Purdue, PA-C   cholecalciferol 1,000 Units Oral Daily Maple Purdue, PA-C   cinacalcet 30 mg Oral Daily With Breakfast Maple Purdue, PA-C   dexamethasone 2 mg Oral Daily Korey Mcgowan MD   epoetin beth 4,000 Units Intravenous After Dialysis Noa Mayen DO   And       epoetin beth 3,000 Units Intravenous After Dialysis [facility] Faheem, DO   heparin (porcine) 2,000 Units Intravenous PRN [facility] Faheem, DO   heparin (porcine) 5,000 Units Subcutaneous [facility] Roopa Bhakta PA-C   mirtazapine 15 mg Oral HS Licha King MD   ondansetron 4 mg Intravenous Q6H PRN Roopa Bhakta PA-C   ondansetron 8 mg Oral [facility] Maria Ines Gaffney MD   pantoprazole 40 mg Oral Early Morning Roopa Bhakta PA-C   polyethylene glycol 17 g Oral Daily PRN Roopa Bhakta PA-C   pravastatin 40 mg Oral Daily With Dinner Roopa Bhakta PA-C   sevelamer 800 mg Oral TID With Meals Roopa Bhakta PA-C        Today, Patient Was Seen By: Dana Ngo MD    ** Please Note: Dictation voice to text software may have been used in the creation of this document   **

## 2018-04-25 NOTE — PHYSICAL THERAPY NOTE
Physical Therapy Cancellation Note  ATTEMPTED TO SEE PT FOR PHYSICAL THERAPY - PT CURRENTLY OFF FLOOR AT HD  WILL ATTEMPT AT A LATER TIME     Carmenza Daniels, PT

## 2018-04-25 NOTE — SOCIAL WORK
Cm reviewed patient with Dr Gail Sebastian  Patient not stable for discharge today  Family meeting to be held on Friday at [address] informed family of this per [facility] () request   Dtr works at [employer]; she provided her work number   cm relayed this information to Catarino Ro reviewing  Patient refused to participate in PT  Weakness, HD pt  Cm following  Cm called patient's son, Carmelita Escoto to discuss [facility] referral   Chivoarthur Elaine requested for son to call facility at [phone]   Son to call  Cm following

## 2018-04-25 NOTE — OCCUPATIONAL THERAPY NOTE
Occupational therapy cancel note: Attempted OT treatment again this afternoon per family request  Pt adamately refuses to particiapte stating she is too tired  RN and CM made aware  OT will continue to follow and treat as appropriate

## 2018-04-25 NOTE — PROGRESS NOTES
NEPHROLOGY PROGRESS NOTE   Zoë Finney 74 y o  female MRN: [id number]  Unit/Bed#: [facility] 808-01 Encounter: [id number]  Reason for Consult: ESRD MWF    ASSESSMENT and PLAN:    57-year-old female with a past medical history of ESRD on MWF, polycystic kidney disease, anemia, former smoker for 40 years who presents on 04/20 with weakness and fatigue for several weeks progressively worsening, and was sent in from dialysis due to hypotension to 80 systolic   Patient has had recent weight loss of 20 lb     1) ESRD - MWF at [address] [place] [address]     - access - LUE AVF  - seen on HD 4/25 - prescription is UF 0-500, Na 140, bicarb 35, F160, time 3 hours, EDW 44 5  - electrolytes stable today  - from renal standpoint, patient does not require daily labs     2) weakness/fatigue/weight loss - etiology unclear, but may be multifactorial     - Palliative care team on board  - mirtazapine was started along with dexamethasone  - further eval per Primary Team  - abd vascular study with patent arteries  - family meeting planned 9 am on 4/27     3) anemia     - EGD/colonoscopy at [facility] with hemorrhoids, rectal ulcer, antral gastritis, non erosive duodenitis   - on AYLEEN - monitor if concern for malignancy  - received pRBC  - CT of C/A/P without evidence of malignancy  - SPEP - faint possible band, LUIS neg  - Hematology on board - EPO level elevated  - GI on board - plan for colonoscopy on tuesday     4) hypotension     - calcium channel blocker was discontinued     5) 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     -   Continue current regimen     6) thrombocytopenia - as per Primary Team     7) cystic disease     - GI monitoring liver cysts     8) lactic acid slightly elevated - unclear if due to liver issue vs less likely infectious    SUBJECTIVE / INTERVAL HISTORY:    Pt has nausea on dialysis today  Pt seen on dialysis  Otherwise tolerating dialysis, with marginal BP   SBP     OBJECTIVE:  Current Weight: Weight - Scale: 43 3 kg (95 lb 8 oz)  Vitals:    04/25/18 0900 04/25/18 0930 04/25/18 0933 04/25/18 1000   BP: (!) 114/47 (!) 92/40 (!) 117/36 99/53   BP Location:       Pulse: 91 95 96 99   Resp:       Temp:       TempSrc:       SpO2:       Weight:       Height:           Intake/Output Summary (Last 24 hours) at 04/25/18 1027  Last data filed at 04/25/18 0930   Gross per 24 hour   Intake              680 ml   Output                0 ml   Net              680 ml     General: NAD, cachectic  Skin: no rash  Eyes: anicteric sclera  ENT: moist mucous membrane  Neck: supple  Chest: CTA b/l  CVS: s1s2  Abdomen: soft, nontender; hepatomegaly on exam  Extremities: no edema  : no rock  Neuro: AAOX3  Psych: normal affect    Medications:    Current Facility-Administered Medications:     acetaminophen (TYLENOL) tablet 650 mg, 650 mg, Oral, Q6H PRN, Elmer Boas, PA-C    calcium carbonate (TUMS) chewable tablet 1,000 mg, 1,000 mg, Oral, Daily PRN, Elmer Boas, PA-C    cholecalciferol (VITAMIN D3) tablet 1,000 Units, 1,000 Units, Oral, Daily, Elmer Boas, PA-C, 1,000 Units at 04/24/18 7519    cinacalcet (SENSIPAR) tablet 30 mg, 30 mg, Oral, Daily With Breakfast, Elmer Boas, PA-C, 30 mg at 04/24/18 7977    dexamethasone (DECADRON) tablet 2 mg, 2 mg, Oral, Daily, Dianne Loco MD, 2 mg at 04/24/18 1752    epoetin beth (EPOGEN,PROCRIT) injection 4,000 Units, 4,000 Units, Intravenous, After Dialysis, 4,000 Units at 04/25/18 9976 **AND** epoetin beth (EPOGEN,PROCRIT) injection 3,000 Units, 3,000 Units, Intravenous, After Dialysis, Suzanne Mayen DO, 3,000 Units at 04/25/18 0745    heparin (porcine) injection 2,000 Units, 2,000 Units, Intravenous, PRN, Suzanne Mayen DO    heparin (porcine) subcutaneous injection 5,000 Units, 5,000 Units, Subcutaneous, Q8H [facility], 5,000 Units at 04/25/18 9062 **AND** Platelet count, , , Once, Adolphus Boas, PA-C    mirtazapine (REMERON) tablet 15 mg, 15 mg, Oral, , Jordy TREVINO MD Bro, 15 mg at 04/24/18 2212    ondansetron [facility]) injection 4 mg, 4 mg, Intravenous, Q6H PRN, Joanie Teran PA-C, 4 mg at 04/23/18 1045    ondansetron (ZOFRAN-ODT) dispersible tablet 8 mg, 8 mg, Oral, Once per day on Mon Wed Fri, YSABEL Gomes, 8 mg at 04/25/18 5116    pantoprazole (PROTONIX) EC tablet 40 mg, 40 mg, Oral, Early Morning, Joanie Teran PA-C, 40 mg at 04/25/18 1291    polyethylene glycol (MIRALAX) packet 17 g, 17 g, Oral, Daily PRN, Joanie Teran PA-C    pravastatin (PRAVACHOL) tablet 40 mg, 40 mg, Oral, Daily With Rohan Jaskaran, JACY, 40 mg at 04/24/18 1721    sevelamer (RENAGEL) tablet 800 mg, 800 mg, Oral, TID With Meals, Joanie Teran PA-C, 800 mg at 04/24/18 1721    Laboratory Results:    Results from last 7 days  Lab Units 04/25/18  0519 04/25/18  0518 04/24/18  0458 04/23/18  0807 04/22/18  0452 04/21/18  1348 04/20/18  1453 04/18/18  1030   WBC Thousand/uL  --  5 64 7 14 9 48 5 83  --  6 80  --    HEMOGLOBIN g/dL  --  7 7* 8 4* 8 9* 8 4*  --  7 6* 7 4*   HEMATOCRIT %  --  25 2* 26 1* 28 5* 26 6*  --  24 3*  --    PLATELETS Thousands/uL  --  75* 83* 124* 71* 72* 102*  --    SODIUM mmol/L 135*  --  135* 133* 136  --  135*  --    POTASSIUM mmol/L 4 4  --  4 2 5 2 4 4  --  4 1  --    CHLORIDE mmol/L 98*  --  97* 99* 100  --  99*  --    CO2 mmol/L 28  --  30 28 28  --  28  --    BUN mg/dL 40*  --  24 38* 20  --  20  --    CREATININE mg/dL 3 85*  --  2 82* 4 09* 2 87*  --  2 93*  --    CALCIUM mg/dL 8 9  --  9 2 8 9 9 0  --  9 1  --    MAGNESIUM mg/dL  --   --  2 0 2 0 2 0  --   --   --    PHOSPHORUS mg/dL  --   --  3 8 4 8* 3 6  --   --   --    TOTAL PROTEIN g/dL  --   --   --   --   --  5 0* 6 7  --    GLUCOSE RANDOM mg/dL 81  --  81 94 79  --  102  --

## 2018-04-25 NOTE — PLAN OF CARE
DISCHARGE PLANNING     Discharge to home or other facility with appropriate resources Progressing        DISCHARGE PLANNING - CARE MANAGEMENT     Discharge to post-acute care or home with appropriate resources Progressing        HEMATOLOGIC - ADULT     Maintains hematologic stability Progressing        METABOLIC, FLUID AND ELECTROLYTES - ADULT     Electrolytes maintained within normal limits Progressing     Fluid balance maintained Progressing        Nutrition/Hydration-ADULT     Nutrient/Hydration intake appropriate for improving, restoring or maintaining nutritional needs Progressing        PAIN - ADULT     Verbalizes/displays adequate comfort level or baseline comfort level Progressing        Potential for Falls     Patient will remain free of falls Progressing        Prexisting or High Potential for Compromised Skin Integrity     Skin integrity is maintained or improved Progressing        SAFETY ADULT     Maintain or return to baseline ADL function Progressing     Patient will remain free of falls Progressing        SKIN/TISSUE INTEGRITY - ADULT     Skin integrity remains intact Progressing

## 2018-04-25 NOTE — OCCUPATIONAL THERAPY NOTE
OT cancel note: Attempted OT treatment, however, pt is currenlty on HD  Will attempt back later if time permits   Will continue to follow as per POC

## 2018-04-26 PROCEDURE — 97110 THERAPEUTIC EXERCISES: CPT

## 2018-04-26 PROCEDURE — 97163 PT EVAL HIGH COMPLEX 45 MIN: CPT

## 2018-04-26 PROCEDURE — G8979 MOBILITY GOAL STATUS: HCPCS

## 2018-04-26 PROCEDURE — 0HBRXZZ EXCISION OF TOE NAIL, EXTERNAL APPROACH: ICD-10-PCS | Performed by: INTERNAL MEDICINE

## 2018-04-26 PROCEDURE — 99231 SBSQ HOSP IP/OBS SF/LOW 25: CPT | Performed by: INTERNAL MEDICINE

## 2018-04-26 PROCEDURE — 97530 THERAPEUTIC ACTIVITIES: CPT

## 2018-04-26 PROCEDURE — G8978 MOBILITY CURRENT STATUS: HCPCS

## 2018-04-26 PROCEDURE — 99232 SBSQ HOSP IP/OBS MODERATE 35: CPT | Performed by: INTERNAL MEDICINE

## 2018-04-26 RX ADMIN — HEPARIN SODIUM 5000 UNITS: 5000 INJECTION, SOLUTION INTRAVENOUS; SUBCUTANEOUS at 06:26

## 2018-04-26 RX ADMIN — RENAGEL 800 MG: 800 TABLET ORAL at 08:02

## 2018-04-26 RX ADMIN — DEXAMETHASONE 2 MG: 2 TABLET ORAL at 08:04

## 2018-04-26 RX ADMIN — PANTOPRAZOLE SODIUM 40 MG: 40 TABLET, DELAYED RELEASE ORAL at 06:26

## 2018-04-26 RX ADMIN — RENAGEL 800 MG: 800 TABLET ORAL at 17:30

## 2018-04-26 RX ADMIN — ONDANSETRON 8 MG: 4 TABLET, ORALLY DISINTEGRATING ORAL at 14:17

## 2018-04-26 RX ADMIN — VITAMIN D, TAB 1000IU (100/BT) 1000 UNITS: 25 TAB at 08:03

## 2018-04-26 RX ADMIN — HEPARIN SODIUM 5000 UNITS: 5000 INJECTION, SOLUTION INTRAVENOUS; SUBCUTANEOUS at 14:17

## 2018-04-26 RX ADMIN — ONDANSETRON 8 MG: 4 TABLET, ORALLY DISINTEGRATING ORAL at 06:26

## 2018-04-26 RX ADMIN — PRAVASTATIN SODIUM 40 MG: 40 TABLET ORAL at 17:30

## 2018-04-26 RX ADMIN — CINACALCET HYDROCHLORIDE 30 MG: 30 TABLET, COATED ORAL at 08:03

## 2018-04-26 RX ADMIN — HEPARIN SODIUM 5000 UNITS: 5000 INJECTION, SOLUTION INTRAVENOUS; SUBCUTANEOUS at 21:17

## 2018-04-26 RX ADMIN — ONDANSETRON 8 MG: 4 TABLET, ORALLY DISINTEGRATING ORAL at 21:17

## 2018-04-26 RX ADMIN — MIRTAZAPINE 15 MG: 15 TABLET, FILM COATED ORAL at 21:17

## 2018-04-26 NOTE — SOCIAL WORK
Cm reviewed patient during care coordination rounds with Dr Brian Ramos  Patient not stable for discharge today  Family meeting to be held with Kenyatta Pineda tomorrow morning at [facility]   HD patient, MWF  Patient experiencing weakness and anemia  [facility] is following  Cm following and working on patient's discharge plan

## 2018-04-26 NOTE — OCCUPATIONAL THERAPY NOTE
Occupational Therapy Treatment Note:       04/26/18 2627   Pain Assessment   Pain Assessment 0-10   Pain Score No Pain   ADL   Where Assessed Supine, bed   Grooming Assistance 4  Minimal Assistance   Grooming Deficit Setup;Verbal cueing; Increased time to complete;Wash/dry hands; Wash/dry face; Teeth care   UB Bathing Assistance 4  Minimal Assistance   UB Bathing Deficit Setup;Verbal cueing;Supervision/safety; Increased time to complete; Chest   Therapeutic Exercise - ROM   UE-ROM Yes   ROM- Right Upper Extremities   R Shoulder AROM; Flexion;ABduction   R Weight/Reps/Sets 3 sets of 5 reps   ROM - Left Upper Extremities    L Shoulder AROM; Flexion;ABduction   L Weight/Reps/Sets 3 set of 5 reps   Cognition   Overall Cognitive Status Impaired   Arousal/Participation Alert; Responsive   Attention Within functional limits   Orientation Level Oriented X4   Memory Within functional limits   Following Commands Follows one step commands with increased time or repetition   Comments pleasant, motivated, mildly sedated   Activity Tolerance   Activity Tolerance Patient tolerated treatment well   Medical Staff Made Aware ok to see per RN   Assessment   Assessment Patient participated in skilled OT with focus on bed mobility, activity tolerance/engagement, UE ROM for carryover into functional task/ADL performance, dressing, hygiene  Patient performed at bed level secondary to mildly sedated and reports of previously "napping"  Patient motivated and cooperative and would benefit from short term rehab with focus on increasing functional strength and independence skills for carryover into her own enviroment  Plan   Treatment Interventions ADL retraining;UE strengthening/ROM; Endurance training;Cognitive reorientation; Compensatory technique education; Energy conservation; Activityengagement   Goal Expiration Date 05/01/18   Treatment Day 1   OT Frequency 3-5x/wk   Recommendation   OT Discharge Recommendation Short Term Rehab   OT - OK to Discharge Yes  (when medically cleared)   Barthel Index   Feeding 5   Bathing 0   Grooming Score 5   Dressing Score 5   Bladder Score 10   Bowels Score 10   Toilet Use Score 5   Transfers (Bed/Chair) Score 10   Mobility (Level Surface) Score 0   Stairs Score 0   B[place]hel Index Score 50   Modified [place] Scale   Modified [place] Scale 4   ARLEEN Angel

## 2018-04-26 NOTE — PLAN OF CARE
Problem: PHYSICAL THERAPY ADULT  Goal: Performs mobility at highest level of function for planned discharge setting  See evaluation for individualized goals  Treatment/Interventions: Functional transfer training, LE strengthening/ROM, Therapeutic exercise, Endurance training, Patient/family training, Equipment eval/education, Bed mobility, Gait training, OT  Equipment Recommended: Walker (RW)       See flowsheet documentation for full assessment, interventions and recommendations  Prognosis: Fair  Problem List: Decreased strength, Decreased endurance, Impaired balance, Decreased mobility, Impaired judgement, Pain  Assessment: PT COMPLETED EVALUATION OF 74YEAR OLD FEMALE ADMITTED TO [facility] ON 4/20/18 WITH SYMPTOMS OF WEAKNESS AND FATIGUE  DIAGNOSES INCLUDE SEVERE PROTEIN-CALORIE MALNUTRITION, ELEVATED TROPONIN, AND SYMPTOMATIC ANEMIA  PATIENT DOES HAVE CONSULT TO PALLIATIVE CARE AND [facility] [address] [facility]  SHE AT THIS TIME EXPRESSES THE DESIRE TO GO TO A FACILITY TO THERAPY  CURRENT MEDICAL AND PHYSICAL INSTABILITIES INCLUDE PAIN, ONGOING MONITORING OF VITAL SIGNS, FALLS RISK, BED/CHAIR ALARMS, AND A REGRESSION IN FUNCTIONAL STATUS FROM BASELINE  PMH IS SIGNIFICANT FOR ESRD-HD, ANEMIA, RECTAL ULCER, GASTRITIS, AND DUODENITIS  PRIOR TO THIS ADMISSION  PATIENT RESIDED ALONE IN [facility] WHERE SHE WAS PREVIOUSLY I WITH MOBILITY (SPC) AND RECEIVED ASSISTANCE WITH ADLS AND IADLS PRN FROM FAMILY  CURRENT IMPAIRMENTS INCLUDE SEVERE DECONDITIONING WITH REDUCED STRENGTH, ACTIVITY TOLERANCE, BALANCE, AND PAIN  DURING PT EVALUATION PATIENT REQUIRED MIN-AX1 FOR SUPINE-->SIT TRANSFER, SIT<-->STAND TRANSFER, AND AMBULATION  PATIENT AMBULATED 5 FEET X 2 W/ RW PRESENTING WITH REDUCED GAIT SPEED AND FORWARD TRUNK FLEXION  THIS PATIENT IS FUNCTIONING BELOW HER BASELINE AND WOULD BENEFIT FROM STR UPON D/C  SHE WILL BENEFIT FROM CONTINUED SKILLED INPT PT THIS ADMISSION TO ACHIEVE MAXIMAL FUNCTION AND SAFETY  Barriers to Discharge: Decreased caregiver support  Barriers to Discharge Comments: RESIDES ALONE  Recommendation: (S) Short-term skilled PT     PT - OK to Discharge: Yes (TO STR WHEN MED ANDREA )    See flowsheet documentation for full assessment     Nate Barnard, PT

## 2018-04-26 NOTE — PROGRESS NOTES
NEPHROLOGY PROGRESS NOTE   Caridad Marley 74 y o  female MRN: [id number]  Unit/Bed#: [facility] 808-01 Encounter: [id number]  Reason for Consult: ESRD MWF    ASSESSMENT and PLAN:    68-year-old female with a past medical history of ESRD on MWF, polycystic kidney disease, anemia, former smoker for 40 years who presents on 04/20 with weakness and fatigue for several weeks progressively worsening, and was sent in from dialysis due to hypotension to 80 systolic   Patient has had recent weight loss of 20 lb     1) ESRD - MWF at [address]  - access - LUE AVF  - next HD 4/27  - from renal standpoint, patient does not require daily labs     2) weakness/fatigue/weight loss - etiology unclear, but may be multifactorial     - Palliative care team on board  - mirtazapine was started along with dexamethasone  - further eval per Primary Team  - abd vascular study with patent arteries  - family meeting planned 9 am on 4/27  - could consider CT with contrast    - reviewed with Primary Team  - continues to have abd discomfort of unclear etiology     3) anemia     - EGD/[facility] at [facility] with hemorrhoids, rectal ulcer, antral gastritis, non erosive duodenitis   - on AYLEEN - monitor if concern for malignancy  - received pRBC  - CT of C/A/P without evidence of malignancy  - SPEP - faint possible band, LUIS neg  - Hematology on board - EPO level elevated  - GI on board      4) hypotension     - calcium channel blocker was discontinued     5) 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     -   Continue current regimen     6) thrombocytopenia - as per Primary Team     7) cystic disease     - GI monitoring liver cysts     8) lactic acid slightly elevated - unclear if due to liver issue vs less likely infectious    SUBJECTIVE / INTERVAL HISTORY:    Pt with abd discomfort this AM  Denies vomiting   Had BM this AM    OBJECTIVE:  Current Weight: Weight - Scale: 43 3 kg (95 lb 8 oz)  Vitals:    04/25/18 1126 04/25/18 1500 04/26/18 0300 04/26/18 0736 BP: (!) 116/44 110/56 125/75 161/68   BP Location:  Right arm Right arm Right arm   Pulse: 90 103 88 102   Resp:  16 16 18   Temp: (!) 96 8 °F (36 °C) 99 6 °F (37 6 °C) 98 5 °F (36 9 °C) 97 6 °F (36 4 °C)   TempSrc: Tympanic Oral Oral Oral   SpO2:  93% 97% 98%   Weight:       Height:           Intake/Output Summary (Last 24 hours) at 04/26/18 0854  Last data filed at 04/25/18 2059   Gross per 24 hour   Intake              700 ml   Output              500 ml   Net              200 ml     General: NAD, cachectic  Skin: no rash  Eyes: anicteric sclera  ENT: moist mucous membrane  Neck: supple  Chest: CTA b/l  CVS: s1s2  Abdomen: soft, tender mid aabd  Extremities: no edema  : no rock  Neuro: AAOX3  Psych: normal affect    Medications:    Current Facility-Administered Medications:     acetaminophen (TYLENOL) tablet 650 mg, 650 mg, Oral, Q6H PRN, Bao Del Castillo PA-C    calcium carbonate (TUMS) chewable tablet 1,000 mg, 1,000 mg, Oral, Daily PRN, Bao Del Castillo PA-C    cholecalciferol (VITAMIN D3) tablet 1,000 Units, 1,000 Units, Oral, Daily, Bao Del Castillo PA-C, 1,000 Units at 04/26/18 0803    cinacalcet (SENSIPAR) tablet 30 mg, 30 mg, Oral, Daily With Breakfast, Bao Del Castillo PA-C, 30 mg at 04/26/18 0803    dexamethasone (DECADRON) tablet 2 mg, 2 mg, Oral, Daily, Luana Lindsay MD, 2 mg at 04/26/18 0804    epoetin beth (EPOGEN,PROCRIT) injection 4,000 Units, 4,000 Units, Intravenous, After Dialysis, 4,000 Units at 04/25/18 0939 **AND** epoetin beth (EPOGEN,PROCRIT) injection 3,000 Units, 3,000 Units, Intravenous, After Dialysis, Melody Mayen DO, 3,000 Units at 04/25/18 3758    heparin (porcine) injection 2,000 Units, 2,000 Units, Intravenous, PRN, Melody Mayen DO    heparin (porcine) subcutaneous injection 5,000 Units, 5,000 Units, Subcutaneous, Q8H [facility], 5,000 Units at 04/26/18 0626 **AND** Platelet count, , , Once, Bao Del Castillo PA-C    mirtazapine (REMERON) tablet 15 mg, 15 mg, Oral, HS, Darryn Mcdaniel MD, 15 mg at 04/25/18 2152    ondansetron [facility]) injection 4 mg, 4 mg, Intravenous, Q6H PRN, Marzena Craft PA-C, 4 mg at 04/23/18 1045    ondansetron (ZOFRAN-ODT) dispersible tablet 8 mg, 8 mg, Oral, Q8H [facility], Brian Jimenez MD, 8 mg at 04/26/18 6471    pantoprazole (PROTONIX) EC tablet 40 mg, 40 mg, Oral, Early Morning, Marzena Craft PA-C, 40 mg at 04/26/18 5201    polyethylene glycol (MIRALAX) packet 17 g, 17 g, Oral, Daily PRN, Marzena Craft PA-C    pravastatin (PRAVACHOL) tablet 40 mg, 40 mg, Oral, Daily With Alana Parekh PA-C, 40 mg at 04/25/18 1537    sevelamer (RENAGEL) tablet 800 mg, 800 mg, Oral, TID With Meals, Marzena Craft PA-C, 800 mg at 04/26/18 0802    Laboratory Results:    Results from last 7 days  Lab Units 04/25/18  0519 04/25/18  0518 04/24/18  0458 04/23/18  0807 04/22/18  0452 04/21/18  1348 04/20/18  1453   WBC Thousand/uL  --  5 64 7 14 9 48 5 83  --  6 80   HEMOGLOBIN g/dL  --  7 7* 8 4* 8 9* 8 4*  --  7 6*   HEMATOCRIT %  --  25 2* 26 1* 28 5* 26 6*  --  24 3*   PLATELETS Thousands/uL  --  75* 83* 124* 71* 72* 102*   SODIUM mmol/L 135*  --  135* 133* 136  --  135*   POTASSIUM mmol/L 4 4  --  4 2 5 2 4 4  --  4 1   CHLORIDE mmol/L 98*  --  97* 99* 100  --  99*   CO2 mmol/L 28  --  30 28 28  --  28   BUN mg/dL 40*  --  24 38* 20  --  20   CREATININE mg/dL 3 85*  --  2 82* 4 09* 2 87*  --  2 93*   CALCIUM mg/dL 8 9  --  9 2 8 9 9 0  --  9 1   MAGNESIUM mg/dL  --   --  2 0 2 0 2 0  --   --    PHOSPHORUS mg/dL  --   --  3 8 4 8* 3 6  --   --    TOTAL PROTEIN g/dL  --   --   --   --   --  5 0* 6 7   GLUCOSE RANDOM mg/dL 81  --  81 94 79  --  102

## 2018-04-26 NOTE — PROGRESS NOTES
Progress Note - Shelton Dorsey 1945, 74 y o  female MRN: [id number]    [facility] Encounter: [id number]    Primary Care Provider: Brian Smart MD   Date and time admitted to hospital: 4/20/2018  1:40 PM        Severe protein-calorie malnutrition ([address] )   Assessment & Plan    Malnutrition Findings:   Malnutrition type: Chronic illness  Degree of Malnutrition: Other severe protein calorie malnutrition (<75% energy intake versus needs for > 1 month resulting in 6 5#(6 4%) wt loss in past 2 months exhibiting hollow orbital look and hollowing temples and protruding clavicle; Treating with Ensure Enlive TID;)    BMI Findings:  BMI Classifications: Underweight < 18 5     Body mass index is 15 65 kg/m²     Continue with prednisone, mirtazapine, monitor p o  intake, nutritionist following          Elevated troponin   Assessment & Plan    · Troponin mildly elevated- no EKG changes or chest pain  · Trend is flat, likely related to end-stage renal disease  · Patient has no symptoms of chest pain, shortness of breath or any other angina equivalents at this time  · Possible component related to type 2 secondary to severe anemia  · No further intervention indicated at this time          Symptomatic anemia   Assessment & Plan    · Acute on chronic anemia, possibly related to kidney disease versus other causes  · Earlier this year underwent EGD/colonscopy at [facility]: Colonoscopy with large internal and external hemorrhoids and small non-bleeding rectal ulcer, EGD with antral gastritis, non erosive duodenitis and hiatal hernia  · Hematology evaluated the patient, sent iron panel, I have added erythropoietin level  · CT chest abdomen and pelvis negative for malignant  · Component of very poor nutritional status  · Status post 1 PRBC on April 21st  · Hemoglobin today 8 9, patient feels better after blood transfusion  · Patient was found to have a rectal ulcer on previous colonoscopy at [facility], this lesion was not biopsied secondary to poor preparation at that time, she was scheduled to have another colonoscopy tomorrow with our GI team, but this time she is refusing as she is unable to drink preparation  Will await for further GI recommendation if an alternative can be given to the patient  · Daughter was informed that patient is refusing colonoscopy  · She wants to have family meeting with palliative care regarding goals of care Friday morning        Weight loss   Assessment & Plan    Patient was evaluated by palliative care today, agreeable to try mirtazapine and low-dose steroid for the treatment of her poor appetite and likely underlying depression/anxiety  Continue with Ensure  Monitor p o  intake closely  Started on metrozapine  Colonoscopy still refusing   For mesenteric artery Doppler done today, did not significant abnormalities        End stage renal disease ([facility] [address] )   Assessment & Plan    · Dialysis MWF  · Nephrology following  · Dialysis today feeling weak all over        * Weakness   Assessment & Plan    · Multifactorial, likely related to very poor nutritional status with weight loss as well as symptomatic anemia  · Work up of malignancy is pending  · PT and OT to evaluate patient  · Treat underlying causes               VTE Pharmacologic Prophylaxis:   Pharmacologic: Heparin  Mechanical VTE Prophylaxis in Place: Yes    Patient Centered Rounds: I have performed bedside rounds with nursing staff today  Discussions with Specialists or Other Care Team Provider: palliative care, GI, nephrology    Education and Discussions with Family / Patient: patient    Time Spent for Care: 45 minutes  More than 50% of total time spent on counseling and coordination of care as described above      Current Length of Stay: 6 day(s)    Current Patient Status: Inpatient   Certification Statement: The patient will continue to require additional inpatient hospital stay due to poor nutritional status and malignancy work up    Discharge Plan: will have a family meeting this week for Aba Alejo    Code Status: Level 2 - DNAR: but accepts endotracheal intubation      Subjective:   I am doing well  I have improved apatite today  I am working on it  Objective:     Vitals:   Temp (24hrs), Av 6 °F (37 °C), Min:97 6 °F (36 4 °C), Max:99 6 °F (37 6 °C)    HR:  [] 102  Resp:  [16-18] 18  BP: (110-161)/(56-75) 161/68  SpO2:  [93 %-98 %] 98 %  Body mass index is 15 65 kg/m²  Input and Output Summary (last 24 hours): Intake/Output Summary (Last 24 hours) at 18 1351  Last data filed at 18 0900   Gross per 24 hour   Intake              118 ml   Output                0 ml   Net              118 ml       Physical Exam:     Physical Exam   Constitutional: She is oriented to person, place, and time  She appears cachectic  She is cooperative  HENT:   Head: Normocephalic and atraumatic  Right Ear: External ear normal    Left Ear: External ear normal    Nose: Nose normal    Mouth/Throat: Oropharynx is clear and moist  No oropharyngeal exudate  Eyes: Conjunctivae and EOM are normal  Pupils are equal, round, and reactive to light  Right eye exhibits no discharge  Left eye exhibits no discharge  No scleral icterus  Neck: Neck supple  No JVD present  No tracheal deviation present  No thyromegaly present  Cardiovascular: Normal rate and regular rhythm  Exam reveals no gallop and no friction rub  No murmur heard  Pulmonary/Chest: Effort normal and breath sounds normal  No stridor  No respiratory distress  She has no wheezes  She has no rales  She exhibits no tenderness  Abdominal: Soft  Bowel sounds are normal  She exhibits distension  She exhibits no mass  There is no tenderness  There is no rebound and no guarding  Musculoskeletal: Normal range of motion  She exhibits no edema, tenderness or deformity  Lymphadenopathy:     She has no cervical adenopathy     Neurological: She is alert and oriented to person, place, and time  She has normal reflexes  A cranial nerve deficit is present  She exhibits normal muscle tone  Coordination normal    Skin: Skin is warm and dry  No rash noted  No erythema  There is pallor  Psychiatric: She has a normal mood and affect  Her behavior is normal  Judgment and thought content normal    Nursing note and vitals reviewed  Additional Data:     Labs:      Results from last 7 days  Lab Units 04/25/18  0518   WBC Thousand/uL 5 64   HEMOGLOBIN g/dL 7 7*   HEMATOCRIT % 25 2*   PLATELETS Thousands/uL 75*   NEUTROS PCT % 87*   LYMPHS PCT % 9*   MONOS PCT % 4   EOS PCT % 0       Results from last 7 days  Lab Units 04/25/18  0519  04/21/18  1348 04/20/18  1453   SODIUM mmol/L 135*  < >  --  135*   POTASSIUM mmol/L 4 4  < >  --  4 1   CHLORIDE mmol/L 98*  < >  --  99*   CO2 mmol/L 28  < >  --  28   BUN mg/dL 40*  < >  --  20   CREATININE mg/dL 3 85*  < >  --  2 93*   CALCIUM mg/dL 8 9  < >  --  9 1   TOTAL PROTEIN g/dL  --   --  5 0* 6 7   BILIRUBIN TOTAL mg/dL  --   --   --  1 18*   ALK PHOS U/L  --   --   --  121*   ALT U/L  --   --   --  7*   AST U/L  --   --   --  18   GLUCOSE RANDOM mg/dL 81  < >  --  102   < > = values in this interval not displayed  * I Have Reviewed All Lab Data Listed Above  * Additional Pertinent Lab Tests Reviewed:  [facility] Admission Reviewed    Imaging:    Imaging Reports Reviewed Today Include:    Imaging Personally Reviewed by Myself Includes:       Recent Cultures (last 7 days):           Last 24 Hours Medication List:     Current Facility-Administered Medications:  acetaminophen 650 mg Oral Q6H PRN ROSA Torres-CLAUDIA   calcium carbonate 1,000 mg Oral Daily PRN ROSA Torres-CLAUDIA   cholecalciferol 1,000 Units Oral Daily ROSA Torres-CLAUDIA   cinacalcet 30 mg Oral Daily With Breakfast Esthela Obando PA-C   dexamethasone 2 mg Oral Daily Lesly Forbes MD   epoetin beth 4,000 Units Intravenous After Dialysis Rosaura Rhoades DO   And       epoetin beth 3,000 Units Intravenous After Dialysis Rei Mayen DO   heparin (porcine) 2,000 Units Intravenous PRN Rei Mayen DO   heparin (porcine) 5,000 Units Subcutaneous [facility] Eugene Duong PA-C   mirtazapine 15 mg Oral HS Norm Mckenzie MD   ondansetron 4 mg Intravenous Q6H PRN Eugene Duong PA-C   ondansetron 8 mg Oral [facility] Quita Pacheco MD   pantoprazole 40 mg Oral Early Morning Eugene Duong PA-C   polyethylene glycol 17 g Oral Daily PRN Eugene Duong PA-C   pravastatin 40 mg Oral Daily With Dinner Eugene Duong PA-C   sevelamer 800 mg Oral TID With Meals Eugene Duong PA-C        Today, Patient Was Seen By: Concepción Macias MD    ** Please Note: Dictation voice to text software may have been used in the creation of this document   **

## 2018-04-26 NOTE — PLAN OF CARE
Problem: OCCUPATIONAL THERAPY ADULT  Goal: Performs self-care activities at highest level of function for planned discharge setting  See evaluation for individualized goals  Treatment Interventions: ADL retraining, Functional transfer training, UE strengthening/ROM, Endurance training, Patient/family training, Equipment evaluation/education, Compensatory technique education, Fine motor coordination activities, Continued evaluation, Energy conservation, Activityengagement          See flowsheet documentation for full assessment, interventions and recommendations  Outcome: Progressing  Limitation: Decreased ADL status, Decreased UE strength, Decreased Safe judgement during ADL, Decreased endurance, Decreased self-care trans, Decreased high-level ADLs, Decreased fine motor control  Prognosis: Fair  Assessment: Patient participated in skilled OT with focus on bed mobility, activity tolerance/engagement, UE ROM for carryover into functional task/ADL performance, dressing, hygiene  Patient performed at bed level secondary to mildly sedated and reports of previously "napping"  Patient motivated and cooperative and would benefit from short term rehab with focus on increasing functional strength and independence skills for carryover into her own enviroment        OT Discharge Recommendation: Short Term Rehab  OT - OK to Discharge: Yes (when medically cleared)  Osvaldo Haley

## 2018-04-26 NOTE — CONSULTS
Consult Routine Foot Care- [place] [address] [address] 74 y o  female MRN: [id number]  Unit/Bed#: [facility] 808-01 Encounter: [id number]    Assessment/Plan     Assessment:  1  Onychomycosis  2  xerosis  3  ESRD on HD    Plan:  - Nails debrided x10 without incidence utilizing a sharp nail nipper to pt's tolerance  - All questions and concerns addressed  - apply ammonium lactate BID as needed  - Podiatry signing off, thank you for the consult  History of Present Illness     HPI:  Ila Chung is a 74 y o  female who presents with elongated toenails  Pt states that they do not see a podiatrist  States that their nails are painful, elongated  They have difficulty applying their socks and shoes  The pressure within their shoe gear is painful and they have been unable to cut their nails adequately  Pt reports history of weakness and does not walk much  Consults  Review of Systems   Constitutional: Negative  HENT: Negative  Eyes: Negative  Respiratory: Negative  Cardiovascular: Negative  Gastrointestinal: Negative  Musculoskeletal: Negative   Skin: elongated thickened toenails  Dry skin   Neurological: Negative  Historical Information   Past Medical History:   Diagnosis Date    Chronic kidney disease     Dyslipidemia      History reviewed  No pertinent surgical history  Social History   History   Alcohol Use No     History   Drug Use No     History   Smoking Status    Never Smoker   Smokeless Tobacco    Never Used     Family History: History reviewed  No pertinent family history      Meds/Allergies   Prescriptions Prior to Admission   Medication    amLODIPine (NORVASC) 10 mg tablet    Cholecalciferol (VITAMIN D) 2000 units CAPS    cholecalciferol (VITAMIN D3) 1,000 units tablet    cinacalcet (SENSIPAR) 30 mg tablet    metoprolol tartrate (LOPRESSOR) 50 mg tablet    pantoprazole (PROTONIX) 40 mg tablet    sevelamer carbonate (RENVELA) 800 mg tablet    simvastatin (ZOCOR) 20 mg tablet No Known Allergies    Objective   First Vitals:   Blood Pressure: 139/77 (04/20/18 1346)  Pulse: 98 (04/20/18 1346)  Temperature: (!) 97 4 °F (36 3 °C) (04/20/18 1356)  Temp Source: Oral (04/20/18 1356)  Respirations: 18 (04/20/18 1346)  Height: 5' 5 5" (166 4 cm) (04/20/18 1635)  Weight - Scale: 44 kg (97 lb) (04/20/18 1346)  SpO2: 94 % (04/20/18 1356)    Current Vitals:   Blood Pressure: 161/68 (04/26/18 0736)  Pulse: 102 (04/26/18 0736)  Temperature: 97 6 °F (36 4 °C) (04/26/18 0736)  Temp Source: Oral (04/26/18 0736)  Respirations: 18 (04/26/18 0736)  Height: 5' 5 5" (166 4 cm) (04/20/18 1635)  Weight - Scale: 43 3 kg (95 lb 8 oz) (04/20/18 1635)  SpO2: 98 % (04/26/18 0736)        /68 (BP Location: Right arm)   Pulse 102   Temp 97 6 °F (36 4 °C) (Oral)   Resp 18   Ht 5' 5 5" (1 664 m)   Wt 43 3 kg (95 lb 8 oz)   SpO2 98%   BMI 15 65 kg/m²     General Appearance:    Alert, cooperative, no distress   Head:    Normocephalic, without obvious abnormality, atraumatic   Eyes:    PERRL, conjunctiva/corneas clear, EOM's intact            Nose:   Moist mucous membranes, no drainage or sinus tenderness   Throat:   No tenderness, no exudates   Neck:   Supple, symmetrical, trachea midline, no JVD   Back:     Symmetric, no CVA tenderness   Lungs:     Clear to auscultation bilaterally, respirations unlabored   Chest wall:    No tenderness or deformity   Heart:    Regular rate and rhythm, S1 and S2 normal, no murmur, rub   or gallop   Abdomen:     Soft, non-tender, bowel sounds active all four quadrants,     no masses, no organomegaly           Extremities:   +0/4 edema B/L, Digital ROM is intact,    Pulses:   R DP is +1/4, R PT is +0/4, L DP is +1/4, L PT is +0/4, CFT< 3sec to all digits  crt is brisk to digits  Skin:   Nails are very thickened, elongated, TTP with notable subungual debris  No open Lesions   Skin of the LE is of dry texture and scaly in nature       Neurologic:  Gross sensation is intact Lab Results:   Admission on 04/20/2018   No results displayed because visit has over 200 results  Imaging: I have personally reviewed pertinent films in PACS  EKG, Pathology, and Other Studies: I have personally reviewed pertinent reports        Code Status: Level 2 - DNAR: but accepts endotracheal intubation  Advance Directive and Living Will:      Power of :    POLST:

## 2018-04-27 ENCOUNTER — APPOINTMENT (INPATIENT)
Dept: DIALYSIS | Facility: HOSPITAL | Age: 73
DRG: 682 | End: 2018-04-27
Attending: INTERNAL MEDICINE
Payer: MEDICARE

## 2018-04-27 PROBLEM — Z71.89 GOALS OF CARE, COUNSELING/DISCUSSION: Status: ACTIVE | Noted: 2018-04-27

## 2018-04-27 LAB
ALBUMIN SERPL BCP-MCNC: 1.6 G/DL (ref 3.5–5)
ALP SERPL-CCNC: 93 U/L (ref 46–116)
ALT SERPL W P-5'-P-CCNC: <6 U/L (ref 12–78)
ANION GAP SERPL CALCULATED.3IONS-SCNC: 11 MMOL/L (ref 4–13)
ANION GAP SERPL CALCULATED.3IONS-SCNC: 8 MMOL/L (ref 4–13)
APTT PPP: 34 SECONDS (ref 23–35)
AST SERPL W P-5'-P-CCNC: 10 U/L (ref 5–45)
BILIRUB SERPL-MCNC: 1.01 MG/DL (ref 0.2–1)
BUN SERPL-MCNC: 22 MG/DL (ref 5–25)
BUN SERPL-MCNC: 37 MG/DL (ref 5–25)
CA-I BLD-SCNC: 1.01 MMOL/L (ref 1.12–1.32)
CALCIUM SERPL-MCNC: 8.3 MG/DL (ref 8.3–10.1)
CALCIUM SERPL-MCNC: 8.5 MG/DL (ref 8.3–10.1)
CHLORIDE SERPL-SCNC: 99 MMOL/L (ref 100–108)
CHLORIDE SERPL-SCNC: 99 MMOL/L (ref 100–108)
CO2 SERPL-SCNC: 25 MMOL/L (ref 21–32)
CO2 SERPL-SCNC: 28 MMOL/L (ref 21–32)
CREAT SERPL-MCNC: 2.42 MG/DL (ref 0.6–1.3)
CREAT SERPL-MCNC: 3.49 MG/DL (ref 0.6–1.3)
ERYTHROCYTE [DISTWIDTH] IN BLOOD BY AUTOMATED COUNT: 17.7 % (ref 11.6–15.1)
ERYTHROCYTE [DISTWIDTH] IN BLOOD BY AUTOMATED COUNT: 17.7 % (ref 11.6–15.1)
GFR SERPL CREATININE-BSD FRML MDRD: 12 ML/MIN/1.73SQ M
GFR SERPL CREATININE-BSD FRML MDRD: 19 ML/MIN/1.73SQ M
GLUCOSE SERPL-MCNC: 105 MG/DL (ref 65–140)
GLUCOSE SERPL-MCNC: 76 MG/DL (ref 65–140)
HCT VFR BLD AUTO: 25.9 % (ref 34.8–46.1)
HCT VFR BLD AUTO: 26.4 % (ref 34.8–46.1)
HGB BLD-MCNC: 8 G/DL (ref 11.5–15.4)
HGB BLD-MCNC: 8.2 G/DL (ref 11.5–15.4)
INR PPP: 2.12 (ref 0.86–1.16)
LACTATE SERPL-SCNC: 3.8 MMOL/L (ref 0.5–2)
MAGNESIUM SERPL-MCNC: 2 MG/DL (ref 1.6–2.6)
MCH RBC QN AUTO: 27.9 PG (ref 26.8–34.3)
MCH RBC QN AUTO: 28.5 PG (ref 26.8–34.3)
MCHC RBC AUTO-ENTMCNC: 30.3 G/DL (ref 31.4–37.4)
MCHC RBC AUTO-ENTMCNC: 31.7 G/DL (ref 31.4–37.4)
MCV RBC AUTO: 90 FL (ref 82–98)
MCV RBC AUTO: 92 FL (ref 82–98)
PLATELET # BLD AUTO: 188 THOUSANDS/UL (ref 149–390)
PLATELET # BLD AUTO: 70 THOUSANDS/UL (ref 149–390)
PMV BLD AUTO: 13.2 FL (ref 8.9–12.7)
PMV BLD AUTO: 13.2 FL (ref 8.9–12.7)
POTASSIUM SERPL-SCNC: 4.3 MMOL/L (ref 3.5–5.3)
POTASSIUM SERPL-SCNC: 4.7 MMOL/L (ref 3.5–5.3)
PROT SERPL-MCNC: 5.8 G/DL (ref 6.4–8.2)
PROTHROMBIN TIME: 24 SECONDS (ref 12.1–14.4)
RBC # BLD AUTO: 2.87 MILLION/UL (ref 3.81–5.12)
RBC # BLD AUTO: 2.88 MILLION/UL (ref 3.81–5.12)
SODIUM SERPL-SCNC: 135 MMOL/L (ref 136–145)
SODIUM SERPL-SCNC: 135 MMOL/L (ref 136–145)
TROPONIN I SERPL-MCNC: 0.08 NG/ML
WBC # BLD AUTO: 18.43 THOUSAND/UL (ref 4.31–10.16)
WBC # BLD AUTO: 5.72 THOUSAND/UL (ref 4.31–10.16)

## 2018-04-27 PROCEDURE — 90935 HEMODIALYSIS ONE EVALUATION: CPT | Performed by: INTERNAL MEDICINE

## 2018-04-27 PROCEDURE — 84484 ASSAY OF TROPONIN QUANT: CPT | Performed by: PHYSICIAN ASSISTANT

## 2018-04-27 PROCEDURE — 93005 ELECTROCARDIOGRAM TRACING: CPT

## 2018-04-27 PROCEDURE — 99233 SBSQ HOSP IP/OBS HIGH 50: CPT | Performed by: FAMILY MEDICINE

## 2018-04-27 PROCEDURE — 83605 ASSAY OF LACTIC ACID: CPT | Performed by: PHYSICIAN ASSISTANT

## 2018-04-27 PROCEDURE — 82330 ASSAY OF CALCIUM: CPT | Performed by: PHYSICIAN ASSISTANT

## 2018-04-27 PROCEDURE — 85027 COMPLETE CBC AUTOMATED: CPT | Performed by: INTERNAL MEDICINE

## 2018-04-27 PROCEDURE — 99233 SBSQ HOSP IP/OBS HIGH 50: CPT | Performed by: PHYSICIAN ASSISTANT

## 2018-04-27 PROCEDURE — 99233 SBSQ HOSP IP/OBS HIGH 50: CPT | Performed by: INTERNAL MEDICINE

## 2018-04-27 PROCEDURE — 85610 PROTHROMBIN TIME: CPT | Performed by: INTERNAL MEDICINE

## 2018-04-27 PROCEDURE — 80053 COMPREHEN METABOLIC PANEL: CPT | Performed by: PHYSICIAN ASSISTANT

## 2018-04-27 PROCEDURE — 85027 COMPLETE CBC AUTOMATED: CPT | Performed by: PHYSICIAN ASSISTANT

## 2018-04-27 PROCEDURE — 80048 BASIC METABOLIC PNL TOTAL CA: CPT | Performed by: INTERNAL MEDICINE

## 2018-04-27 PROCEDURE — 85730 THROMBOPLASTIN TIME PARTIAL: CPT | Performed by: INTERNAL MEDICINE

## 2018-04-27 PROCEDURE — 83735 ASSAY OF MAGNESIUM: CPT | Performed by: PHYSICIAN ASSISTANT

## 2018-04-27 RX ORDER — ONDANSETRON 2 MG/ML
4 INJECTION INTRAMUSCULAR; INTRAVENOUS ONCE
Status: COMPLETED | OUTPATIENT
Start: 2018-04-27 | End: 2018-04-27

## 2018-04-27 RX ORDER — FENTANYL CITRATE 50 UG/ML
25 INJECTION, SOLUTION INTRAMUSCULAR; INTRAVENOUS
Status: DISCONTINUED | OUTPATIENT
Start: 2018-04-27 | End: 2018-04-29 | Stop reason: HOSPADM

## 2018-04-27 RX ORDER — PROMETHAZINE HYDROCHLORIDE 25 MG/ML
12.5 INJECTION, SOLUTION INTRAMUSCULAR; INTRAVENOUS EVERY 6 HOURS PRN
Status: DISCONTINUED | OUTPATIENT
Start: 2018-04-27 | End: 2018-04-27

## 2018-04-27 RX ORDER — METOPROLOL TARTRATE 5 MG/5ML
2.5 INJECTION INTRAVENOUS ONCE
Status: COMPLETED | OUTPATIENT
Start: 2018-04-27 | End: 2018-04-27

## 2018-04-27 RX ORDER — MAGNESIUM SULFATE HEPTAHYDRATE 40 MG/ML
2 INJECTION, SOLUTION INTRAVENOUS ONCE
Status: COMPLETED | OUTPATIENT
Start: 2018-04-27 | End: 2018-04-27

## 2018-04-27 RX ORDER — METOPROLOL TARTRATE 5 MG/5ML
5 INJECTION INTRAVENOUS ONCE
Status: DISCONTINUED | OUTPATIENT
Start: 2018-04-27 | End: 2018-04-27

## 2018-04-27 RX ORDER — ALBUMIN (HUMAN) 12.5 G/50ML
25 SOLUTION INTRAVENOUS ONCE
Status: COMPLETED | OUTPATIENT
Start: 2018-04-27 | End: 2018-04-27

## 2018-04-27 RX ORDER — SUCRALFATE 1 G/1
1 TABLET ORAL
Status: DISCONTINUED | OUTPATIENT
Start: 2018-04-27 | End: 2018-04-29 | Stop reason: HOSPADM

## 2018-04-27 RX ORDER — LACTULOSE 20 G/30ML
10 SOLUTION ORAL 2 TIMES DAILY
Status: DISCONTINUED | OUTPATIENT
Start: 2018-04-27 | End: 2018-04-29 | Stop reason: HOSPADM

## 2018-04-27 RX ORDER — DEXAMETHASONE SODIUM PHOSPHATE 4 MG/ML
2 INJECTION, SOLUTION INTRA-ARTICULAR; INTRALESIONAL; INTRAMUSCULAR; INTRAVENOUS; SOFT TISSUE DAILY
Status: DISCONTINUED | OUTPATIENT
Start: 2018-04-28 | End: 2018-04-27

## 2018-04-27 RX ORDER — PROMETHAZINE HYDROCHLORIDE 25 MG/ML
12.5 INJECTION, SOLUTION INTRAMUSCULAR; INTRAVENOUS EVERY 6 HOURS PRN
Status: DISCONTINUED | OUTPATIENT
Start: 2018-04-27 | End: 2018-04-29 | Stop reason: HOSPADM

## 2018-04-27 RX ORDER — ONDANSETRON 2 MG/ML
INJECTION INTRAMUSCULAR; INTRAVENOUS
Status: COMPLETED
Start: 2018-04-27 | End: 2018-04-27

## 2018-04-27 RX ORDER — ALBUMIN, HUMAN INJ 5% 5 %
25 SOLUTION INTRAVENOUS ONCE
Status: COMPLETED | OUTPATIENT
Start: 2018-04-27 | End: 2018-04-27

## 2018-04-27 RX ADMIN — EPOETIN ALFA 4000 UNITS: 4000 SOLUTION INTRAVENOUS; SUBCUTANEOUS at 13:08

## 2018-04-27 RX ADMIN — FENTANYL CITRATE 25 MCG: 50 INJECTION, SOLUTION INTRAMUSCULAR; INTRAVENOUS at 21:37

## 2018-04-27 RX ADMIN — PHENYLEPHRINE HYDROCHLORIDE 180 MCG/MIN: 10 INJECTION INTRAVENOUS at 21:52

## 2018-04-27 RX ADMIN — METOROPROLOL TARTRATE 2.5 MG: 5 INJECTION, SOLUTION INTRAVENOUS at 16:57

## 2018-04-27 RX ADMIN — ONDANSETRON 8 MG: 4 TABLET, ORALLY DISINTEGRATING ORAL at 05:42

## 2018-04-27 RX ADMIN — RENAGEL 800 MG: 800 TABLET ORAL at 08:20

## 2018-04-27 RX ADMIN — PHENYLEPHRINE HYDROCHLORIDE 200 MCG/MIN: 10 INJECTION INTRAVENOUS at 18:18

## 2018-04-27 RX ADMIN — CINACALCET HYDROCHLORIDE 30 MG: 30 TABLET, COATED ORAL at 08:21

## 2018-04-27 RX ADMIN — PANTOPRAZOLE SODIUM 40 MG: 40 TABLET, DELAYED RELEASE ORAL at 05:42

## 2018-04-27 RX ADMIN — ONDANSETRON 4 MG: 2 INJECTION INTRAMUSCULAR; INTRAVENOUS at 19:04

## 2018-04-27 RX ADMIN — ALBUMIN HUMAN 25 G: 0.25 SOLUTION INTRAVENOUS at 17:04

## 2018-04-27 RX ADMIN — SUCRALFATE 1 G: 1 TABLET ORAL at 14:29

## 2018-04-27 RX ADMIN — SODIUM CHLORIDE 500 ML: 0.9 INJECTION, SOLUTION INTRAVENOUS at 16:52

## 2018-04-27 RX ADMIN — HEPARIN SODIUM 5000 UNITS: 5000 INJECTION, SOLUTION INTRAVENOUS; SUBCUTANEOUS at 14:29

## 2018-04-27 RX ADMIN — EPOETIN ALFA 3000 UNITS: 3000 SOLUTION INTRAVENOUS; SUBCUTANEOUS at 13:08

## 2018-04-27 RX ADMIN — ONDANSETRON 8 MG: 2 INJECTION INTRAMUSCULAR; INTRAVENOUS at 16:19

## 2018-04-27 RX ADMIN — ALBUMIN HUMAN 25 G: 0.25 SOLUTION INTRAVENOUS at 18:20

## 2018-04-27 RX ADMIN — ONDANSETRON 8 MG: 2 INJECTION INTRAMUSCULAR; INTRAVENOUS at 23:05

## 2018-04-27 RX ADMIN — METOPROLOL TARTRATE 2.5 MG: 1 INJECTION, SOLUTION INTRAVENOUS at 17:09

## 2018-04-27 RX ADMIN — ALBUMIN HUMAN 25 G: 0.05 INJECTION, SOLUTION INTRAVENOUS at 19:49

## 2018-04-27 RX ADMIN — DEXAMETHASONE 2 MG: 2 TABLET ORAL at 08:20

## 2018-04-27 RX ADMIN — HEPARIN SODIUM 5000 UNITS: 5000 INJECTION, SOLUTION INTRAVENOUS; SUBCUTANEOUS at 05:41

## 2018-04-27 RX ADMIN — PROMETHAZINE HYDROCHLORIDE 12.5 MG: 25 INJECTION INTRAMUSCULAR; INTRAVENOUS at 09:57

## 2018-04-27 RX ADMIN — CALCIUM GLUCONATE 3 G: 98 INJECTION, SOLUTION INTRAVENOUS at 20:18

## 2018-04-27 RX ADMIN — MAGNESIUM SULFATE HEPTAHYDRATE 2 G: 40 INJECTION, SOLUTION INTRAVENOUS at 18:16

## 2018-04-27 RX ADMIN — VITAMIN D, TAB 1000IU (100/BT) 1000 UNITS: 25 TAB at 08:20

## 2018-04-27 NOTE — PROGRESS NOTES
Progress note - Palliative and Supportive Care   Zoë Finney 74 y o  female [id number]    Patient Active Problem List   Diagnosis    Atrial fibrillation with rapid ventricular response (HCC)    [facility] stage renal disease ([facility] )    Hemodialysis patient ([facility] )    Essential hypertension    Polycystic kidney    Hyperlipidemia    Anemia, normocytic normochromic    Weakness    Weight loss    Symptomatic anemia    Elevated troponin    Osteoporosis    Dyslipidemia    Ambulatory dysfunction    Severe protein-calorie malnutrition (HCC)    Goals of care, counseling/discussion      Plan:  1  Symptom management - nausea and vomiting, intermittent abd pain   - scheduled ondansetron IV   - rotate to mineralocorticoid from dex tomorrow (improve BPs)   - may continue phenergan prior to HD runs, d/t somnolence   - Will rotate away from IV opioids, d/t hypotension today   - Consider haldol PO for breakthrough nausea (less sedating)    2  Goals - medical management of illness   - HD runs as per Dr Kimmy Mcmillan and Nephro   - No further scopes, surgeries, invasive cares  - May use IV medications and fluids   - NO CODE, but may use ICU for medical management  Code Status: DNR/DNI - Level 3   Decisional apparatus:  Patient is competent on my exam today  If competence is lost, patient's substitute decision maker would default to children by [place] Act 169  Advance Directive / Living Will / POLST:  None on file    Amari Stevens MD  Palliative and Supportive Care  Pager: [phone]       Interval history:       Held fam mtg at bedside  In attendance:    - ADEBAYO Miller - Dr Ad Reinoso - Dr Sveta Rowe  - Family - pt's son, daughter, DIL     We discussed the use of multiple modes of medical and surgical cares, including HD, endoscopy, code resuscitation; we also discussed the relative benefits and burdens of these cares    Pt is quite frustrated with lab draws and procedures, but has so far found HD to be generally tolerable  We discussed that HD may be contributing to relative bowel ischemia, predicating her symptoms, but that it is undoubtedly helping her live longer  She is therefore more agreeable to symptom meds for her troubles, and to help her complete her HD runs as rec'd by Nephro  We resolve to continue this plan of care until she wishes to stop HD, or it is no longer safe to complete it      MEDICATIONS / ALLERGIES:     current meds:   Current Facility-Administered Medications   Medication Dose Route Frequency    acetaminophen (TYLENOL) tablet 650 mg  650 mg Oral Q6H PRN    calcium carbonate (TUMS) chewable tablet 1,000 mg  1,000 mg Oral Daily PRN    calcium gluconate 3 g in sodium chloride 0 9 % 100 mL IVPB  3 g Intravenous Once    cholecalciferol (VITAMIN D3) tablet 1,000 Units  1,000 Units Oral Daily    cinacalcet (SENSIPAR) tablet 30 mg  30 mg Oral Daily With Breakfast    [START ON 4/28/2018] dexamethasone (DECADRON) injection 2 mg  2 mg Intravenous Daily    diltiazem (CARDIZEM) 125 mg in sodium chloride 0 9 % 125 mL infusion  1-15 mg/hr Intravenous Titrated    epoetin beth (EPOGEN,PROCRIT) injection 4,000 Units  4,000 Units Intravenous After Dialysis    And    epoetin beth (EPOGEN,PROCRIT) injection 3,000 Units  3,000 Units Intravenous After Dialysis    heparin (porcine) injection 2,000 Units  2,000 Units Intravenous PRN    heparin (porcine) subcutaneous injection 5,000 Units  5,000 Units Subcutaneous Q8H [address]    HYDROmorphone (DILAUDID) injection 0 1 mg  0 1 mg Intravenous Q3H PRN    lactulose 20 g/30 mL oral solution 10 g  10 g Oral BID    metoprolol (LOPRESSOR) injection 5 mg  5 mg Intravenous Once    mirtazapine (REMERON) tablet 15 mg  15 mg Oral HS    ondansetron (ZOFRAN) 8 mg in sodium chloride 0 9 % 50 mL IVPB  8 mg Intravenous TID    pantoprazole (PROTONIX) EC tablet 40 mg  40 mg Oral Early Morning    phenylephrine (VICENTE-SYNEPHRINE) 50 mg (STANDARD CONCENTRATION) in sodium chloride 0 9% 250 mL   mcg/min Intravenous Titrated    pravastatin (PRAVACHOL) tablet 40 mg  40 mg Oral Daily With Dinner    promethazine (PHENERGAN) injection 12 5 mg  12 5 mg Intravenous Q6H PRN    sevelamer (RENAGEL) tablet 800 mg  800 mg Oral TID With Meals    sodium chloride 0 9 % bolus    Code/Trauma/Sedation Med    sucralfate (CARAFATE) tablet 1 g  1 g Oral 4x Daily (AC & HS)       No Known Allergies    OBJECTIVE:    Physical Exam  Physical Exam   Constitutional: No distress  frail   HENT:   Head: Normocephalic and atraumatic  Right Ear: External ear normal    Left Ear: External ear normal    Mouth/Throat: Oropharyngeal exudate (mucous membranes tacky) present  Eyes: Conjunctivae and EOM are normal  Pupils are equal, round, and reactive to light  Right eye exhibits no discharge  Left eye exhibits no discharge  Neck: No tracheal deviation present  Cardiovascular: Normal rate and regular rhythm  Pulmonary/Chest: Effort normal  No stridor  No respiratory distress  Abdominal: Soft  She exhibits distension  There is tenderness (diffuse)  Scaphoid   Musculoskeletal: She exhibits no edema  Skin: Skin is warm and dry  No rash noted  She is not diaphoretic  No erythema  There is pallor  Psychiatric: Her behavior is normal  Judgment and thought content normal        Lab Results:   I have personally reviewed pertinent labs  , CBC:   Lab Results   Component Value Date    WBC 18 43 (H) 04/27/2018    HGB 8 0 (L) 04/27/2018    HCT 26 4 (L) 04/27/2018    MCV 92 04/27/2018     04/27/2018    MCH 27 9 04/27/2018    MCHC 30 3 (L) 04/27/2018    RDW 17 7 (H) 04/27/2018    MPV 13 2 (H) 04/27/2018   , CMP:   Lab Results   Component Value Date     (L) 04/27/2018    K 4 3 04/27/2018    CL 99 (L) 04/27/2018    CO2 25 04/27/2018    ANIONGAP 11 04/27/2018    BUN 22 04/27/2018    CREATININE 2 42 (H) 04/27/2018    GLUCOSE 105 04/27/2018    CALCIUM 8 3 04/27/2018    AST 10 04/27/2018    ALT <6 (L) 04/27/2018    ALKPHOS 93 04/27/2018    PROT 5 8 (L) 04/27/2018    BILITOT 1 01 (H) 04/27/2018    EGFR 19 04/27/2018   Poor renal function prevents use of morphine    Imaging Studies: none pertinent  EKG, Pathology, and Other Studies: none    Counseling / Coordination of Care  Total floor / unit time spent today 35+ minutes  Greater than 50% of total time was spent with the patient and / or family counseling and / or coordination of care   A description of the counseling / coordination of care: adjustment of parenteral controlled substances for advanced pain and symptoms; review and assessment of decisional apparatus and capacity, applicable legal codes and extant documents; consideration of hospice

## 2018-04-27 NOTE — SOCIAL WORK
Escalated team meeting held with the following individuals present: Hai Piña (CSWS), Dr Anthony Marie ([facility]), Dr Gorge Hodge (palliative care), Henry Fonseca (palliative care), Anurag Iverson (GI), Dr Nereida Grimes (nephrology), patient, Ermelinda Em (daughter), Debra Gambino (son) and Alannah Robles (daughter in law)  Update was provided from [facility], patient was admitted due to abdominal discomfort and has continued nausea  It was recommended for a second colonoscopy for suspicious of possible cancer however patient was unable to complete the bowel prep and has declined a colonoscopy at this time  Previous colonoscopy showed an ischemic ulcer  CAT scan did not have any findings but was limited due to not having contrast  Discussed patient's goals if she were to have additional studies completed  Discussed if patient has cancer possible treatments and if patient would elect for this  Discussed that patient has a poor nutritional status and chemotherapy may not be safe for patient due to ESRD  Patient does not want to proceed with additional testing at this time  She stated her main goal is to "feel better " Palliative Care discussed adjusting medications to address nausea  Patient wants to continue with dialysis, discussed that patient has the option to stop dialysis at any time based on her goals of care  Dialysis has not hd complications  Her blood pressure as been marginal  Concerns about possible dehydration were addressed  Pt will be given IV fluids during dialysis so it can be controlled  Discussed importance of quality of life  Patient has expressed a desire to not continue with invasive testing and wants to continue her dialysis  Discussed plan for discharge to [place] when medically stable  Pt's son, Debra Gambino, is completing paperwork on Tuesday with [employer]  Discussed that CM would set up initial transportation to dialysis from [place]  Patient can be seen as a follow up with palliative care as an outpatient       CSWS updated MSW CM LM  MSW CM to consult CSWS for any complex needs   CM will follow for discharge planning

## 2018-04-27 NOTE — PROGRESS NOTES
Pt complaining she was nauseated and just not feeling well  Twila Bailey iv zofran but pt was becoming more and more restless saying her chest hurt and she felt dizzy  BP 89/53, heartrate in 180's  Dr Anita Henderson in to see pt and rapid was called  See rapid flow sheet

## 2018-04-27 NOTE — ASSESSMENT & PLAN NOTE
As patient has low blood pressures, she did not get her metoprolol dose  She has her dialysis today, no fluids were taken out  She was noted to have HR of 190s and bps 70/30s  Rapid response was called, they have given her normal saline bolus, then gave her albumin, followed by 2 5 mg of lo pressor x 2, blood pressure did not go down  Critical care has made decision to transfer patient to icu and perhaps place her on mulugeta drip and try either cardizem vs  Cardioversion, family has made decision to continue with this and patient was moved down to ICU

## 2018-04-27 NOTE — SOCIAL WORK
LILIW attended a family meeting with patient, Amadeo Pickard (daughter), Bud Washington (son), daughter-in-law, Dr María Mcbride, Dr Monique Parekh, and Juan LEE, and Yousif Meraz (CM)  All aspects of patient's current medical status reviewed and options given  Patient and family are in agreement to: stop invasive testing, continuation of dialysis (until no-longer tolerated or wanted), and continuation with Palliative Medicine  If possible family would like patient to continue with [facility] Nephrology and Palliative medicine    Patient scheduled with Dr Cayden Alvarado and Shanice Lewis for May 14th at 1:20    Appointment information given to family

## 2018-04-27 NOTE — RAPID RESPONSE
Progress Note - Rapid Response   Ailyn Griffiths 74 y o  female MRN: [id number]    Time Called ( Time): 4101  Date Called: 4/27/  Level of Care: MS  Room#: 123  Arrival Time ( Time): 2686  Event End Time ( Time): 0898  MEWS score at time of Rapid Response: --  Primary reason for call: Acute change in HR  Interventions:  Airway/Breathing:  O2 Mask/Nasal  Circulation: IV Fluid Bolus and EKG  Other Treatments: EJ IV placed       Assessment:   1  Afib with RVR  2  ESRD on dialysis Monday Wednesday Friday      Plan:   · Lopressor 2 5mg x 2 with improvement of HR from 180's to 110-140  · Albumin 250cc   · IV access  · Initiate peripheral pressors  · Transfer to Critical Care  · Family discussion          HPI/Chief Complaint (Background/Situation):   Ailyn Griffiths is a 74y o  year old female who told nursing she didn't feel well  Vitals were taken and patient was found to be in rapid afib with borderline blood pressure  On arrival patient 's BP in 90's  Patient's attending in the room, family meeting had earlier today not pursing invasive testing, but to continue dialysis  Patient given 250 of albumin with hopes of improving blood pressure  Blood pressure remained stable with fluids and 2 5 mg of Lopressor was given with this heart rate came down into the 140s  Additional fluid bolus given and of normal saline 200 cc additional 2 5 mg of Lopressor given  After this for a time we are unable to obtain blood pressure patient was felt to have blood pressure of 92 palp  Patient requires pediatric sized manual cuff for accurate pressures and there was difficulty in locating on these  Was located blood pressure was found to be in the 60s  Decision made at that time to upgrade critical care transfer  During this time critical care attending updated multiple times  Family discussion had with daughter, son, daughter-in-law regarding cardioversion    After much family discussion patient and family determined that they would only do medical management  No cardioversion  Patient transferred to critical care and placed on Bi-Synephrine drip that was quickly up titrated to 200 in order to maintain a map of 65  Historical Information   Past Medical History:   Diagnosis Date    Chronic kidney disease     Dyslipidemia      History reviewed  No pertinent surgical history    Social History   History   Alcohol Use No     History   Drug Use No     History   Smoking Status    Never Smoker   Smokeless Tobacco    Never Used     Family History: non-contributory    Meds/Allergies     Current Facility-Administered Medications:  acetaminophen 650 mg Oral Q6H PRN Adolphus Boas, PA-C    albumin human 25 g Intravenous Once Alejo Rosa PA-C    calcium carbonate 1,000 mg Oral Daily PRN Adolphus Boas, PA-C    calcium gluconate 3 g Intravenous Once Richard Lin PA-C    cholecalciferol 1,000 Units Oral Daily Adolphus Boas, PA-C    cinacalcet 30 mg Oral Daily With Breakfast Adolphus Boas, PA-C    [START ON 4/28/2018] dexamethasone 2 mg Intravenous Daily John Floresestone    diltiazem 1-15 mg/hr Intravenous Titrated Art Street MD    epoetin beth 4,000 Units Intravenous After Dialysis Zabrina Gibbs DO    And        epoetin beth 3,000 Units Intravenous After Dialysis Suzanne Mayen DO    heparin (porcine) 2,000 Units Intravenous PRN Suzanne Mayen DO    heparin (porcine) 5,000 Units Subcutaneous [facility] Adolphus Boas, PA-C    HYDROmorphone 0 1 mg Intravenous Q3H PRN John Stockton    lactulose 10 g Oral BID John Stockton    magnesium sulfate 2 g Intravenous Once Tami Lin PA-C    metoprolol 5 mg Intravenous Once [address] MD    mirtazapine 15 mg Oral HS Dianne Loco MD    ondansetron 8 mg Intravenous TID Carolyne Brown Last Rate: 8 mg (04/27/18 1619)   pantoprazole 40 mg Oral Early Morning Adolphus Boas, PA-C    phenylephine  mcg/min Intravenous Titrated Tami Lin PA-C Last Rate: 200 mcg/min (04/27/18 1818)   pravastatin 40 mg Oral Daily With OptTownJACY    promethazine 12 5 mg Intravenous Q6H PRN Bev Degroot MD    sevelamer 800 mg Oral TID With Meals Midge JACY Paula    sodium chloride   Code/Trauma/Sedation Med Bev Degroot MD    sucralfate 1 g Oral 4x Daily (AC & HS) [place]          diltiazem 1-15 mg/hr    phenylephine  mcg/min Last Rate: 200 mcg/min (04/27/18 1818)       No Known Allergies    ROS: Negative except "pain everywhere"     Physical Exam:  Gen:  Cachectic ill-appearing female laying semifowlers  Neck:No JVD  Chest:Irregular rhythm and tachycardic   Abd:Distended, tender to palpation  Neuro:Alert and appropriate, non-focal exam  Skin:cool and dry      Intake/Output Summary (Last 24 hours) at 04/27/18 1832  Last data filed at 04/27/18 1257   Gross per 24 hour   Intake              970 ml   Output              263 ml   Net              707 ml       Respiratory    Lab Data (Last 4 hours)    None         O2/Vent Data (Last 4 hours)    None              Invasive Devices     Peripheral Intravenous Line            Peripheral IV 04/24/18 Right Forearm 3 days    Peripheral IV 04/27/18 Right External Jugular less than 1 day    Peripheral IV 04/27/18 Right Hand less than 1 day          Line            Hemodialysis AV Fistula Left -- days                DIAGNOSTIC DATA:    Lab: I have personally reviewed pertinent lab results     CBC:     Results from last 7 days  Lab Units 04/27/18  0446   WBC Thousand/uL 5 72   HEMOGLOBIN g/dL 8 2*   HEMATOCRIT % 25 9*   PLATELETS Thousands/uL 70*     CMP:     Results from last 7 days  Lab Units 04/27/18  1707 04/27/18  0446 04/25/18  0519  04/21/18  1348   SODIUM mmol/L 135* 135* 135*  < >  --    POTASSIUM mmol/L 4 3 4 7 4 4  < >  --    CHLORIDE mmol/L 99* 99* 98*  < >  --    CO2 mmol/L 25 28 28  < >  --    BUN mg/dL 22 37* 40*  < >  --    CREATININE mg/dL 2 42* 3 49* 3 85*  < >  --    CALCIUM mg/dL 8 3 8 5 8 9  < >  --    TOTAL PROTEIN g/dL 5 8*  --   --   --  5 0*   BILIRUBIN TOTAL mg/dL 1 01*  --   --   --   --    ALK PHOS U/L 93  --   --   --   --    ALT U/L <6*  --   --   --   --    AST U/L 10  --   --   --   --    GLUCOSE RANDOM mg/dL 105 76 81  < >  --    < > = values in this interval not displayed  PT/INR:   Lab Results   Component Value Date    INR 2 12 (H) 04/27/2018   ,   Magnesium: No results found for: MAG,   Phosphorous: No results found for: PHOS    Microbiology:  No results found for: Maricarmen Madrid, SPUTUMCULTUR      OUTCOME:   Transferred to Critical Care Unit  Family notified of transfer: yes  Family member contacted: Daughter, son, daughter in law at bedside  Code Status: Level 2 - DNAR: but accepts endotracheal intubation  Critical Care Time: Total Critical Care time spent 47 minutes excluding procedures, teaching and family updates

## 2018-04-27 NOTE — PROGRESS NOTES
NEPHROLOGY PROGRESS NOTE   Frankey Dibble 74 y o  female MRN: [id number]  Unit/Bed#: [facility] 808-01 Encounter: [id number]  Reason for Consult: ESRD MWF    ASSESSMENT and PLAN:    68-year-old female with a past medical history of ESRD on MWF, polycystic kidney disease, anemia, former smoker for 40 years who presents on 04/20 with weakness and fatigue for several weeks progressively worsening, and was sent in from dialysis due to hypotension to 80 systolic   Patient has had recent weight loss of 20 lb     1) ESRD - MWF at [address]  - access - LUE AVF  - pt seen this AM at family meeting   - then, seen on dialysis later in the morning/early afternoon on 4/27 at which time patient was tolerating treatment  Was given 150 cc NS    - then received call patient requested to discontinue dialysis today after one hour and 45 minutes of treatment  - Family meeting brief note - pt declines further invasive testing which also does include CT with contrast  Pt would like to continue dialysis for as long as can tolerate to attempt to extend quality of life  Pt will let team know when quality of life is being hindered beyond what patient would like to tolerate and at which point, patient will discontinue dialysis also  - for now, will reevaluate 4/28 in AM for dialysis needs  BMP in AM to assist with this        2) weakness/fatigue/weight loss - etiology unclear, but may be multifactorial     - Palliative care team on board  - mirtazapine was started along with dexamethasone  - further eval per Primary Team  - abd vascular study with patent arteries  - reviewed with Primary Team  - continues to have abd discomfort of unclear etiology     3) anemia     - EGD/colonoscopy at [facility] with hemorrhoids, rectal ulcer, antral gastritis, non erosive duodenitis   - on AYLEEN - given patient goals, continue this for now as may reduce transfusions  - received pRBC  - CT of C/A/P without evidence of malignancy  - SPEP - faint possible band, LUIS neg  - Hematology on board - EPO level elevated  - GI on board      4) hypotension     - calcium channel blocker was discontinued     5) 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     -   Continue current regimen     6) thrombocytopenia - as per Primary Team     7) cystic disease     - GI monitoring liver cysts       SUBJECTIVE / INTERVAL HISTORY:    Having intermittent abd pain/nausea       OBJECTIVE:  Current Weight: Weight - Scale: 43 3 kg (95 lb 8 oz)  Vitals:    04/27/18 1130 04/27/18 1200 04/27/18 1230 04/27/18 1257   BP: (!) 108/48 (!) 103/44 (!) 117/43 (!) 117/47   BP Location:       Pulse: 94 84 89 93   Resp:       Temp:       TempSrc:       SpO2:       Weight:       Height:           Intake/Output Summary (Last 24 hours) at 04/27/18 1352  Last data filed at 04/27/18 1257   Gross per 24 hour   Intake              970 ml   Output              263 ml   Net              707 ml     General: NAD, cachectic  Skin: no rash  Eyes: anicteric sclera  ENT: moist mucous membrane  Neck: supple  Chest: CTA b/l  CVS: s1s2  Abdomen: soft, tender mid abd  Extremities: no edema  : no rock  Neuro: AAOX3  Psych: normal affect    Medications:    Current Facility-Administered Medications:     acetaminophen (TYLENOL) tablet 650 mg, 650 mg, Oral, Q6H PRN, Esthela Obando PA-C    calcium carbonate (TUMS) chewable tablet 1,000 mg, 1,000 mg, Oral, Daily PRN, Esthela Obando PA-C    cholecalciferol (VITAMIN D3) tablet 1,000 Units, 1,000 Units, Oral, Daily, Esthela Obando PA-C, 1,000 Units at 04/27/18 0820    cinacalcet (SENSIPAR) tablet 30 mg, 30 mg, Oral, Daily With Breakfast, ROSA Torres-CLAUDIA, 30 mg at 04/27/18 0821    [START ON 4/28/2018] dexamethasone (DECADRON) injection 2 mg, 2 mg, Intravenous, Daily, Jl Galarza    epoetin beth (EPOGEN,PROCRIT) injection 4,000 Units, 4,000 Units, Intravenous, After Dialysis, 4,000 Units at 04/27/18 1308 **AND** epoetin beth (EPOGEN,PROCRIT) injection 3,000 Units, 3,000 Units, Intravenous, After Dialysis, Ning Abdi DO, 3,000 Units at 04/27/18 1308    heparin (porcine) injection 2,000 Units, 2,000 Units, Intravenous, PRN, Katiana Mayen DO    heparin (porcine) subcutaneous injection 5,000 Units, 5,000 Units, Subcutaneous, Q8H [facility], 5,000 Units at 04/27/18 0541 **AND** Platelet count, , , Once, Maritza Blackwell PA-C    HYDROmorphone (DILAUDID) injection 0 1 mg, 0 1 mg, Intravenous, Q3H PRN, Zhou Rodriguez    lactulose 20 g/30 mL oral solution 10 g, 10 g, Oral, BID, John [place]    mirtazapine (REMERON) tablet 15 mg, 15 mg, Oral, HS, Sebas Fernandez MD, 15 mg at 04/26/18 2117    ondansetron (ZOFRAN) 8 mg in sodium chloride 0 9 % 50 mL IVPB, 8 mg, Intravenous, TID, Christopher Mcclain [place]    pantoprazole (PROTONIX) EC tablet 40 mg, 40 mg, Oral, Early Morning, Maritza Blackwell PA-C, 40 mg at 04/27/18 0542    pravastatin (PRAVACHOL) tablet 40 mg, 40 mg, Oral, Daily With Harden JACY Casas, 40 mg at 04/26/18 1730    promethazine (PHENERGAN) injection 12 5 mg, 12 5 mg, Intravenous, Q6H PRN, Vira Du MD, 12 5 mg at 04/27/18 0957    sevelamer (RENAGEL) tablet 800 mg, 800 mg, Oral, TID With Meals, Maritza Blackwell PA-C, 800 mg at 04/27/18 0820    sucralfate (CARAFATE) tablet 1 g, 1 g, Oral, 4x Daily (AC & HS), Zhou Rodriguez    Laboratory Results:    Results from last 7 days  Lab Units 04/27/18  0446 04/25/18  0519 04/25/18  0518 04/24/18  0458 04/23/18  0807 04/22/18  0452 04/21/18  1348 04/20/18  1453   WBC Thousand/uL 5 72  --  5 64 7 14 9 48 5 83  --  6 80   HEMOGLOBIN g/dL 8 2*  --  7 7* 8 4* 8 9* 8 4*  --  7 6*   HEMATOCRIT % 25 9*  --  25 2* 26 1* 28 5* 26 6*  --  24 3*   PLATELETS Thousands/uL 70*  --  75* 83* 124* 71* 72* 102*   SODIUM mmol/L 135* 135*  --  135* 133* 136  --  135*   POTASSIUM mmol/L 4 7 4 4  --  4 2 5 2 4 4  --  4 1   CHLORIDE mmol/L 99* 98*  --  97* 99* 100  --  99*   CO2 mmol/L 28 28  --  30 28 28  --  28   BUN mg/dL 37* 40*  --  24 38* 20  --  20   CREATININE mg/dL 3 49* 3 85*  --  2 82* 4 09* 2 87*  --  2 93*   CALCIUM mg/dL 8 5 8 9  --  9 2 8 9 9 0  --  9 1   MAGNESIUM mg/dL  --   --   --  2 0 2 0 2 0  --   --    PHOSPHORUS mg/dL  --   --   --  3 8 4 8* 3 6  --   --    TOTAL PROTEIN g/dL  --   --   --   --   --   --  5 0* 6 7   GLUCOSE RANDOM mg/dL 76 81  --  81 94 79  --  102

## 2018-04-27 NOTE — ASSESSMENT & PLAN NOTE
Per discussion this morning with palliative care, gi, nephrology, family daughter Yandel Oswald and son, daughter in law are present  They have decided no invasive procedures such as CT scan, colonoscopy or others invasive studies  Nephrology has discussed regarding dialysis, patient at this time wants to continue as long as her blood pressure holds, she tolerates HD, Nephrology will be continuing  She has a choice any point to stop Dialysis  Plan for her to go to rehab at this point and follow with palliative care outpatient  We were in agreement with this plan     She has rapid response today and was transferred to ICU

## 2018-04-27 NOTE — PROGRESS NOTES
Progress Note - Jack Fernandez 1945, 74 y o  female MRN: [id number]    Unit/Bed#: [facility] 415-01 Encounter: [id number]    Primary Care Provider: Desiree Soto MD   Date and time admitted to hospital: 4/20/2018  1:40 PM        Goals of care, counseling/discussion   Assessment & Plan    Per discussion this morning with palliative care, gi, nephrology, family daughter Cierra Calderon and son, daughter in law are present  They have decided no invasive procedures such as CT scan, colonoscopy or others invasive studies  Nephrology has discussed regarding dialysis, patient at this time wants to continue as long as her blood pressure holds, she tolerates HD, Nephrology will be continuing  She has a choice any point to stop Dialysis  Plan for her to go to rehab at this point and follow with palliative care outpatient  We were in agreement with this plan  She has rapid response today and was transferred to ICU         Severe protein-calorie malnutrition ([address] )   Assessment & Plan    Malnutrition Findings:   Malnutrition type: Chronic illness  Degree of Malnutrition: Other severe protein calorie malnutrition (<75% energy intake versus needs for > 1 month resulting in 6 5#(6 4%) wt loss in past 2 months exhibiting hollow orbital look and hollowing temples and protruding clavicle; Treating with Ensure Enlive TID;)    BMI Findings:  BMI Classifications: Underweight < 18 5     Body mass index is 15 65 kg/m²     Continue with prednisone, mirtazapine, monitor p o  intake, nutritionist following          Elevated troponin   Assessment & Plan    · Troponin mildly elevated- no EKG changes or chest pain  · Trend is flat, likely related to end-stage renal disease  · Patient has no symptoms of chest pain, shortness of breath or any other angina equivalents at this time  · Possible component related to type 2 secondary to severe anemia  · No further intervention indicated at this time          Symptomatic anemia   Assessment & Plan · Acute on chronic anemia, possibly related to kidney disease versus other causes  · Earlier this year underwent EGD/colonscopy at [facility]: Colonoscopy with large internal and external hemorrhoids and small non-bleeding rectal ulcer, EGD with antral gastritis, non erosive duodenitis and hiatal hernia  · Hematology evaluated the patient, sent iron panel, I have added erythropoietin level  · CT chest abdomen and pelvis negative for malignant  · Component of very poor nutritional status  · Status post 1 PRBC on April 21st  · Hemoglobin today 8 9, patient feels better after blood transfusion  · Patient was found to have a rectal ulcer on previous colonoscopy at [facility], this lesion was not biopsied secondary to poor preparation at that time, she was scheduled to have another colonoscopy tomorrow with our GI team, but this time she is refusing as she is unable to drink preparation    Will await for further GI recommendation if an alternative can be given to the patient  · Daughter was informed that patient is refusing colonoscopy  · She wants to have family meeting with palliative care regarding goals of care Friday morning        Weight loss   Assessment & Plan    Patient was evaluated by palliative care today, agreeable to try mirtazapine and low-dose steroid for the treatment of her poor appetite and likely underlying depression/anxiety  Continue with Ensure  Monitor p o  intake closely  Started on metrozapine  Colonoscopy still refusing   For mesenteric artery Doppler done today, did not significant abnormalities        End stage renal disease ([address] )   Assessment & Plan    · Dialysis MWF  · Nephrology following  · Dialysis today feeling weak all over        Atrial fibrillation with rapid ventricular response ([address] )   Assessment & Plan    As patient has low blood pressures, she did not get her metoprolol dose  She has her dialysis today, no fluids were taken out  She was noted to have HR of 190s and bps 70/30s  Rapid response was called, they have given her normal saline bolus, then gave her albumin, followed by 2 5 mg of lo pressor x 2, blood pressure did not go down  Critical care has made decision to transfer patient to icu and perhaps place her on mulugeta drip and try either cardizem vs  Cardioversion, family has made decision to continue with this and patient was moved down to ICU  * Weakness   Assessment & Plan    · Multifactorial, likely related to very poor nutritional status with weight loss as well as symptomatic anemia  · Work up of malignancy is pending  · PT and OT to evaluate patient  · Treat underlying causes              VTE Pharmacologic Prophylaxis:   Pharmacologic: Pharmacologic VTE Prophylaxis contraindicated due to not on a/c due to gi bleeding  Mechanical VTE Prophylaxis in Place: Yes    Patient Centered Rounds: I have performed bedside rounds with nursing staff today  Discussions with Specialists or Other Care Team Provider: palliative care, critical care, gi, nephrology    Education and Discussions with Family / Patient: patient    Time Spent for Care: 45 minutes  More than 50% of total time spent on counseling and coordination of care as described above  Current Length of Stay: 7 day(s)    Current Patient Status: Inpatient   Certification Statement: The patient will continue to require additional inpatient hospital stay due to a  fib now    Discharge Plan: transferred to icu    Code Status: Level 2 - DNAR: but accepts endotracheal intubation      Subjective:   I am feeling my heart racing today  Objective:     Vitals:   Temp (24hrs), Av 1 °F (36 7 °C), Min:97 5 °F (36 4 °C), Max:99 2 °F (37 3 °C)    HR:  [] 100  Resp:  [16-24] 21  BP: ()/(30-59) 100/48  SpO2:  [91 %-100 %] 97 %  Body mass index is 15 65 kg/m²  Input and Output Summary (last 24 hours):        Intake/Output Summary (Last 24 hours) at 18 1800  Last data filed at 18 1257   Gross per 24 hour Intake              970 ml   Output              263 ml   Net              707 ml       Physical Exam:     Physical Exam   Constitutional: She is oriented to person, place, and time  She appears well-developed and well-nourished  HENT:   Head: Normocephalic and atraumatic  Right Ear: External ear normal    Left Ear: External ear normal    Nose: Nose normal    Mouth/Throat: No oropharyngeal exudate  Eyes: Conjunctivae and EOM are normal  Pupils are equal, round, and reactive to light  Left eye exhibits no discharge  No scleral icterus  Neck: Normal range of motion  Neck supple  No JVD present  No tracheal deviation present  No thyromegaly present  Cardiovascular: Normal rate, regular rhythm and intact distal pulses  Exam reveals no gallop and no friction rub  Murmur heard  Pulmonary/Chest: Effort normal and breath sounds normal  No stridor  No respiratory distress  She has no wheezes  She has no rales  She exhibits no tenderness  Abdominal: Bowel sounds are normal  She exhibits no distension and no mass  There is no tenderness  There is no rebound and no guarding  Musculoskeletal: Normal range of motion  She exhibits deformity  She exhibits no edema or tenderness  Lymphadenopathy:     She has no cervical adenopathy  Neurological: She is alert and oriented to person, place, and time  She has normal reflexes  She displays normal reflexes  No cranial nerve deficit  She exhibits normal muscle tone  Coordination normal    Skin: Skin is warm and dry  No rash noted  No erythema  No pallor  Psychiatric: She has a normal mood and affect  Her behavior is normal  Judgment and thought content normal    Nursing note and vitals reviewed        Additional Data:     Labs:      Results from last 7 days  Lab Units 04/27/18  0446 04/25/18  0518   WBC Thousand/uL 5 72 5 64   HEMOGLOBIN g/dL 8 2* 7 7*   HEMATOCRIT % 25 9* 25 2*   PLATELETS Thousands/uL 70* 75*   NEUTROS PCT %  --  87*   LYMPHS PCT %  --  9* MONOS PCT %  --  4   EOS PCT %  --  0       Results from last 7 days  Lab Units 04/27/18  1707   SODIUM mmol/L 135*   POTASSIUM mmol/L 4 3   CHLORIDE mmol/L 99*   CO2 mmol/L 25   BUN mg/dL 22   CREATININE mg/dL 2 42*   CALCIUM mg/dL 8 3   TOTAL PROTEIN g/dL 5 8*   BILIRUBIN TOTAL mg/dL 1 01*   ALK PHOS U/L 93   ALT U/L <6*   AST U/L 10   GLUCOSE RANDOM mg/dL 105       Results from last 7 days  Lab Units 04/27/18  1711   INR  2 12*       * I Have Reviewed All Lab Data Listed Above  * Additional Pertinent Lab Tests Reviewed:  [address] [facility] Admission Reviewed    Imaging:    Imaging Reports Reviewed Today Include:    Imaging Personally Reviewed by Myself Includes:     Recent Cultures (last 7 days):           Last 24 Hours Medication List:     Current Facility-Administered Medications:  [MAR Hold] acetaminophen 650 mg Oral Q6H PRN Emily Cao PA-C    [facility] Hold] albumin human 25 g Intravenous Once Ashwin Beard MD    [MAR Hold] calcium carbonate 1,000 mg Oral Daily PRN Emily Cao PA-C    [facility] Hold] cholecalciferol 1,000 Units Oral Daily Emily Cao PA-C    [facility] Hold] cinacalcet 30 mg Oral Daily With Breakfast Emily Cao PA-C    [facility] Hold] dexamethasone 2 mg Intravenous Daily [facility]    diltiazem 1-15 mg/hr Intravenous Titrated Ashwin Beard MD    [MAR Hold] epoetin beth 4,000 Units Intravenous After Dialysis Marcia Chu DO    And        [facility] Hold] epoetin beth 3,000 Units Intravenous After Dialysis Marcia Chu DO    [facility] Hold] heparin (porcine) 2,000 Units Intravenous PRN Magda Mayen DO    [facility] Hold] heparin (porcine) 5,000 Units Subcutaneous [facility] Emily Cao [facility] Hold] HYDROmorphone 0 1 mg Intravenous Q3H PRN Mitch Hector    [facility] Hold] lactulose 10 g Oral BID Mitch Hector    [MAR Hold] metoprolol 5 mg Intravenous Once Ashwin Beard MD    [MAR Hold] mirtazapine 15 mg Oral HS Comfort Ambriz MD [MAR Hold] ondansetron 8 mg Intravenous TID Annette [place] Last Rate: 8 mg (04/27/18 1619)   [MAR Hold] pantoprazole 40 mg Oral Early Morning Melissa Counter, JACY    phenylephine  mcg/min Intravenous Titrated Tami Lin PA-C    [MAR Hold] pravastatin 40 mg Oral Daily With Bloggerce, PABLANE    [facility] Hold] promethazine 12 5 mg Intravenous Q6H PRN Anatoly Dunaway MD    [facility] Hold] sevelamer 800 mg Oral TID With Meals Melissa Counter, JACY    sodium chloride   Code/Trauma/Sedation Med Anatoly Dunaway MD    [facility] Hold] sucralfate 1 g Oral 4x Daily (AC & HS) Fatmata Bonner         Today, Patient Was Seen By: Anatoly Dunaway MD    ** Please Note: Dictation voice to text software may have been used in the creation of this document   **

## 2018-04-27 NOTE — PROGRESS NOTES
GI attended family meeting today with patient, Chanel Mosley (daughter), Ally Brush (son), daughter-in-law, Dr Hali Osgood with [facility], Dr Anuja Sanchez with Nephrology, Mari SMITH and Dr Ester Dance with Palliative Medicine, and Sonia Mercedes with case managment  We spent the meeting discussing patient's clinical status, overall prognosis, diagnostic testing options, potential treatments, and goals of care  After extensive discussion, patient and family are in agreement to stop invasive testing, continue dialysis (until no longer tolerated or desired) and continue follow-up with Palliative Medicine  Total of 40 minutes spent discussing patient's care plan  Thank you for the consultation  GI will sign-off  Please call with any questions or concerns

## 2018-04-27 NOTE — SOCIAL WORK
Cm reviewed patient during care coordination rounds with Dr Barron Wiseman  Family meeting was held this morning  Patient and family agreed to stop invasive testing but would like to continue dialysis  Palliative to continue following patient  Cm awaiting medical clearance from medical team   [facility] stated they are not expecting a bed to become available until Monday  Cm requested [facility] to call if something becomes available before Monday  Cm confirmed that [facility] is the closest location  Son Meredeth Mohs had a question about purchasing a WC for patient  [place] confirmed that their facility [facility] can be used for transport to/from dialysis  Cm informed patient's son of this and awaiting call from patient's dtr to discuss same information  Patient used [facility] transport in the past but family is aware that [facility] will cover WC Van expense to/from dialysis  Cm awaiting determination of transport family would like to use for patient  Cm continues to work on patient's discharge plan

## 2018-04-28 PROCEDURE — 99233 SBSQ HOSP IP/OBS HIGH 50: CPT | Performed by: FAMILY MEDICINE

## 2018-04-28 RX ADMIN — HYDROCORTISONE SODIUM SUCCINATE 50 MG: 100 INJECTION, POWDER, FOR SOLUTION INTRAMUSCULAR; INTRAVENOUS at 16:11

## 2018-04-28 RX ADMIN — HYDROCORTISONE SODIUM SUCCINATE 50 MG: 100 INJECTION, POWDER, FOR SOLUTION INTRAMUSCULAR; INTRAVENOUS at 09:45

## 2018-04-28 RX ADMIN — FENTANYL CITRATE 25 MCG: 50 INJECTION, SOLUTION INTRAMUSCULAR; INTRAVENOUS at 17:33

## 2018-04-28 RX ADMIN — ONDANSETRON 8 MG: 2 INJECTION INTRAMUSCULAR; INTRAVENOUS at 23:01

## 2018-04-28 RX ADMIN — ONDANSETRON 8 MG: 2 INJECTION INTRAMUSCULAR; INTRAVENOUS at 09:44

## 2018-04-28 RX ADMIN — ONDANSETRON 8 MG: 2 INJECTION INTRAMUSCULAR; INTRAVENOUS at 16:10

## 2018-04-28 RX ADMIN — PROMETHAZINE HYDROCHLORIDE 12.5 MG: 25 INJECTION INTRAMUSCULAR; INTRAVENOUS at 12:52

## 2018-04-28 RX ADMIN — FENTANYL CITRATE 25 MCG: 50 INJECTION, SOLUTION INTRAMUSCULAR; INTRAVENOUS at 02:46

## 2018-04-28 RX ADMIN — PHENYLEPHRINE HYDROCHLORIDE 140 MCG/MIN: 10 INJECTION INTRAVENOUS at 02:49

## 2018-04-28 NOTE — TREATMENT PLAN
Discussed with ICU team and nurses and events noted  It would be difficult to place on HD given hypotension and hemodynamic instability  ICU team to further discuss goals of care with patient today  Patient was seen by my colleague yesterday  I am available to help in any decision making the family may have regarding Renal Replacement Therapy today but will hold dialysis as this would not coincide with decisions made for comfort  Please call me with any questions or concerns

## 2018-04-28 NOTE — PROGRESS NOTES
Patient resting comfortably in bed with family at bedside  VSS  Titrating mulugeta gtt as able  Will continue to monitor

## 2018-04-28 NOTE — PROGRESS NOTES
Medical Critical Care Attending Progress Note    Spoke with Pt's son and daughter regarding goals of care  Will transition to comfort care at this time  Continue with symptom management per palliative care recommendations  Maintain Neosynephrine for now  Awaiting arrival of son, expected in the early hours of the morning  Decision regarding continuation of dialysis to be officially determined in conjunction with Pt  and her family, primary care team, palliative care and nephrology  Goal to maintain comfort at this time

## 2018-04-28 NOTE — PROGRESS NOTES
Progress Note - Critical Care   Barrie Valdivia 74 y o  female MRN: [id number]  Unit/Bed#: [facility] 415-01 Encounter: [id number]    Attending Physician: Mariano Saba MD      ______________________________________________________________________  Assessment and Plan:   Principal Problem:    Weakness  Active Problems:    Atrial fibrillation with rapid ventricular response (HCC)    End stage renal disease ([address] )    Weight loss    Symptomatic anemia    Elevated troponin    Severe protein-calorie malnutrition (HCC)    Goals of care, counseling/discussion  Resolved Problems:    * No resolved hospital problems  *        Neuro: cont neuro exam  Failure to thrive  CV: monitor on tele for now  No defib/shock  Cont mulugeta for bp  Wean as able  Currently in afib  Will hold home metoprolol 2/2 hypotension and pressors  Pulm: NC o2 for comfort    GI: gi following  Declined colonoscopy  abd mass noted  Hx of gi bleed  No obvious bleeding at this time  Cont ppi  : monitor I/o  Hold HD 2/2 hypotension  Pt does not want cvvh or central line placed  ID: no obvious infxn at this time  Monitor  Heme: stable at this time  Endo: monitor bg     Msk/Skin: reposition q 2h  Disposition: await palliative care discussion regarding hospice  Cont comfort care  May transition to home hospice? Code Status: Level 4 - Comfort Care    Counseling / Coordination of Care  Total time spent today 42 minutes  Greater than 50% of total time was spent with the patient and / or family counseling and / or coordination of care  A description of the counseling / coordination of care: goals of care  pt wishes  what to expect from future discussions  hx of hospital stay to this point   ______________________________________________________________________    Chief Complaint: tired    24 Hour Events: o/n events reviewed  Pt is currently comfort care but still on mulugeta  Will cont to wean as able    Discussions to be had today with palliative for next steps  Review of Systems   Constitutional: Positive for activity change, appetite change and fatigue  HENT: Negative  Respiratory: Negative  Cardiovascular: Negative  Gastrointestinal: Positive for abdominal distention and abdominal pain  Genitourinary: Negative  Skin: Negative  Neurological: Negative       ______________________________________________________________________    Physical Exam:   Neuro:  aaxox3, nad  cv:  Irregular rr  +systolic murmur   -rg  resp:  cta b/l   -rrw  abd:  Distended  +nodular, hard ruq mass, mild tenderness to palpation  No peritoneal signs  Ext: cachectic, +pulses, no edema, matta, generalized weakness  Skin: warm, dry, no rash    ______________________________________________________________________  Vitals:    18 0300 18 0400 18 0600 18 1000   BP: 133/52 138/55 126/57 118/53   Pulse: 76 80 76 78   Resp: 20 16 (!) 31 (!) 23   Temp:  (!) 97 °F (36 1 °C)     TempSrc:  Oral     SpO2: 93% 96% 95% 97%   Weight:       Height:           Temperature:   Temp (24hrs), Av 6 °F (36 4 °C), Min:97 °F (36 1 °C), Max:98 1 °F (36 7 °C)    Current Temperature: (!) 97 °F (36 1 °C)  Weights:   IBW: 58 15 kg    Body mass index is 15 65 kg/m²  Weight (last 2 days)     None           No results found for: PHART, URY5VFP, PO2ART, UHN3EZW, F1ISKXXE, BEART, SOURCE  SpO2: SpO2: 97 %  Intake and Outputs:  I/O       01 -  0700    P  O  118 320     I V  (mL/kg)  1064 1 (24 6)     Other  150     IV Piggyback  390     Total Intake(mL/kg) 118 (2 7) 1924 1 (44 4)     Urine (mL/kg/hr) 0 (0)      Other  263 (0 3)     Stool 0 (0)      Total Output 0 263      Net +118 +1661 1             Unmeasured Stool Occurrence 1 x            Nutrition:        Diet Orders            Start     Ordered    18 1537  Diet Regular; Regular House  Diet effective now     Question Answer Comment   Diet Type Regular    Regular Regular House    RD to adjust diet per protocol? Yes        04/25/18 1537 04/25/18 1537  Dietary nutrition supplements  Once     Comments:  Flavor of choice   Question Answer Comment   Select Supplement: Ensure Enlive-Strawberry    Frequency Breakfast, Lunch, Dinner        04/25/18 1537        Oatmeal today    Labs:     Results from last 7 days  Lab Units 04/27/18 1707 04/27/18 0446 04/25/18 0518 04/24/18  0458 04/23/18  0807   WBC Thousand/uL 18 43* 5 72 5 64 7 14 9 48   HEMOGLOBIN g/dL 8 0* 8 2* 7 7* 8 4* 8 9*   HEMATOCRIT % 26 4* 25 9* 25 2* 26 1* 28 5*   PLATELETS Thousands/uL 188 70* 75* 83* 124*   NEUTROS PCT %  --   --  87* 88* 89*   MONOS PCT %  --   --  4 6 4       Results from last 7 days  Lab Units 04/27/18 1707 04/27/18 0446 04/25/18  0519  04/21/18  1348   SODIUM mmol/L 135* 135* 135*  < >  --    POTASSIUM mmol/L 4 3 4 7 4 4  < >  --    CHLORIDE mmol/L 99* 99* 98*  < >  --    CO2 mmol/L 25 28 28  < >  --    BUN mg/dL 22 37* 40*  < >  --    CREATININE mg/dL 2 42* 3 49* 3 85*  < >  --    CALCIUM mg/dL 8 3 8 5 8 9  < >  --    TOTAL PROTEIN g/dL 5 8*  --   --   --  5 0*   BILIRUBIN TOTAL mg/dL 1 01*  --   --   --   --    ALK PHOS U/L 93  --   --   --   --    ALT U/L <6*  --   --   --   --    AST U/L 10  --   --   --   --    GLUCOSE RANDOM mg/dL 105 76 81  < >  --    < > = values in this interval not displayed      Results from last 7 days  Lab Units 04/27/18 1707 04/24/18 0458 04/23/18  0807   MAGNESIUM mg/dL 2 0 2 0 2 0     Lab Results   Component Value Date    PHOS 3 8 04/24/2018    PHOS 4 8 (H) 04/23/2018    PHOS 3 6 04/22/2018        Results from last 7 days  Lab Units 04/27/18  1711   INR  2 12*   PTT seconds 34       0  Lab Value Date/Time   TROPONINI 0 08 (H) 04/27/2018 1707   TROPONINI 0 09 (H) 04/20/2018 2331   TROPONINI 0 09 (H) 04/20/2018 1908   TROPONINI 0 08 (H) 04/20/2018 1453       Results from last 7 days  Lab Units 04/27/18  1707 04/22/18  1446   LACTIC ACID mmol/L 3 8* 2 1*     ABG:No results found for: PHART, YTW5IFL, PO2ART, EFZ7UGD, K1UZVIYA, BEART, SOURCE  Imaging: no recent images   Tele:  afib  Micro:  No results found for: Orest Maxcy, WOUNDCULT, SPUTUMCULTUR  Allergies: No Known Allergies  Medications:   Scheduled Meds:  Current Facility-Administered Medications:  acetaminophen 650 mg Oral Q6H PRN Eather Curling, PA-C    calcium carbonate 1,000 mg Oral Daily PRN Eather Curling, PA-CLAUDIA    cholecalciferol 1,000 Units Oral Daily Eather Curling, PA-CLAUDIA    cinacalcet 30 mg Oral Daily With Breakfast Eather Curling, PA-CLAUDIA    epoetin beth 4,000 Units Intravenous After Dialysis Fátima Rain DO    And        epoetin beth 3,000 Units Intravenous After Dialysis Jimmie Mayen DO    fentanyl citrate (PF) 25 mcg Intravenous Q1H PRN Kaylee Guidry PA-C    heparin (porcine) 2,000 Units Intravenous PRN Jimmie Mayen DO    heparin (porcine) 5,000 Units Subcutaneous Q8H [facility] Eather Curling, JACY    hydrocortisone sodium succinate 50 mg Intravenous BID (AM & Afternoon) [facility]    lactulose 10 g Oral BID [facility]    mirtazapine 15 mg Oral HS Gavino Johnson MD    ondansetron 8 mg Intravenous TID Shilpi [facility] Last Rate: 8 mg (04/28/18 0944)   pantoprazole 40 mg Oral Early Morning Eather Curling, JACY    phenylephine  mcg/min Intravenous Titrated Tami Lin PA-C Last Rate: 140 mcg/min (04/28/18 0249)   pravastatin 40 mg Oral Daily With Food on the TableJACY    promethazine 12 5 mg Intravenous Q6H PRN Slade Tellez MD    sevelamer 800 mg Oral TID With Meals Eather Curling, PA-C    sodium chloride   Code/Trauma/Sedation Med Slade Tellez MD    sucralfate 1 g Oral 4x Daily (AC & HS) [facility]      Continuous Infusions:  phenylephine  mcg/min Last Rate: 140 mcg/min (04/28/18 0249)     PRN Meds:    acetaminophen 650 mg Q6H PRN   calcium carbonate 1,000 mg Daily PRN   epoetin beth 4,000 Units After Dialysis   And     epoetin beth 3,000 Units After Dialysis   fentanyl citrate (PF) 25 mcg Q1H PRN   heparin (porcine) 2,000 Units PRN   promethazine 12 5 mg Q6H PRN   sodium chloride  Code/Trauma/Sedation Med     VTE Pharmacologic Prophylaxis: Heparin  VTE Mechanical Prophylaxis: sequential compression device  Invasive lines and devices: Invasive Devices     Peripheral Intravenous Line            Peripheral IV 04/27/18 Right External Jugular less than 1 day          Line            Hemodialysis AV Fistula Left -- days                     Portions of the record may have been created with voice recognition software  Occasional wrong word or "sound a like" substitutions may have occurred due to the inherent limitations of voice recognition software  Read the chart carefully and recognize, using context, where substitutions have occurred      Art Dixon PA-C

## 2018-04-28 NOTE — PROGRESS NOTES
Progress note - Palliative and Supportive Care   Sam Perea 74 y o  female [id number]    Patient Active Problem List   Diagnosis    Atrial fibrillation with rapid ventricular response (HCC)    [facility] stage [facility] disease ([facility] [address] )    Hemodialysis patient ([facility] [address] )    Essential hypertension    Polycystic kidney    Hyperlipidemia    Anemia, normocytic normochromic    Weakness    Weight loss    Symptomatic anemia    Elevated troponin    Osteoporosis    Dyslipidemia    Ambulatory dysfunction    Severe protein-calorie malnutrition (HCC)    Goals of care, counseling/discussion      Plan:  1  Symptom management - nausea and vomiting, intermittent abd pain   - Scheduled ondansetron IV   - Continue hydrocort 50mg IV BID while in hospital, rotate to dex at [facility]  - Will STOP phenergan, rotate to haldol   - Will add back IV opioids, d/t comfort care request    2  Goals - transition to hospice   - No further scopes, surgeries, invasive cares  - May use IV medications and fluids   - NO CODE, but may use ICU for medical management    - No further HD     Code Status: DNR/DNI - Level 4   Decisional apparatus:  Patient is competent on my exam today  If competence is lost, patient's substitute decision maker would default to children by [place] 169  Advance Directive / Living Will / POLST:  None on file    Mitch Hector MD  Palliative and Supportive Care  Pager: [phone]       Interval history:       Since last visit, she has decompensated dramatically  She was unable to maintain BPs during HD run, and was transferred to ICU for low SBPs  Pressors were initiated, and the family and the pt decided that further escalation beyond this was not appropriate nor consistent with her wishes  Today, she has improved pain and nausea, but she is still on pressors  A plan has been made to wean from these while awaiting family presence, and visit from hospice liaison    They are all agreeable now to transfer to [facility] for hospice care, as available  MEDICATIONS / ALLERGIES:     current meds:   Current Facility-Administered Medications   Medication Dose Route Frequency    acetaminophen (TYLENOL) tablet 650 mg  650 mg Oral Q6H PRN    calcium carbonate (TUMS) chewable tablet 1,000 mg  1,000 mg Oral Daily PRN    cholecalciferol (VITAMIN D3) tablet 1,000 Units  1,000 Units Oral Daily    cinacalcet (SENSIPAR) tablet 30 mg  30 mg Oral Daily With Breakfast    epoetin beth (EPOGEN,PROCRIT) injection 4,000 Units  4,000 Units Intravenous After Dialysis    And    epoetin beth (EPOGEN,PROCRIT) injection 3,000 Units  3,000 Units Intravenous After Dialysis    fentanyl citrate (PF) 100 MCG/2ML 25 mcg  25 mcg Intravenous Q1H PRN    heparin (porcine) injection 2,000 Units  2,000 Units Intravenous PRN    heparin (porcine) subcutaneous injection 5,000 Units  5,000 Units Subcutaneous Q8H [address]    hydrocortisone sodium succinate (PF) (Solu-CORTEF) injection 50 mg  50 mg Intravenous BID (AM & Afternoon)    lactulose 20 g/30 mL oral solution 10 g  10 g Oral BID    mirtazapine (REMERON) tablet 15 mg  15 mg Oral HS    ondansetron (ZOFRAN) 8 mg in sodium chloride 0 9 % 50 mL IVPB  8 mg Intravenous TID    pantoprazole (PROTONIX) EC tablet 40 mg  40 mg Oral Early Morning    phenylephrine (VICENTE-SYNEPHRINE) 50 mg (STANDARD CONCENTRATION) in sodium chloride 0 9% 250 mL   mcg/min Intravenous Titrated    pravastatin (PRAVACHOL) tablet 40 mg  40 mg Oral Daily With Dinner    promethazine (PHENERGAN) injection 12 5 mg  12 5 mg Intravenous Q6H PRN    sevelamer (RENAGEL) tablet 800 mg  800 mg Oral TID With Meals    sodium chloride 0 9 % bolus    Code/Trauma/Sedation Med    sucralfate (CARAFATE) tablet 1 g  1 g Oral 4x Daily (AC & HS)       No Known Allergies    OBJECTIVE:    Physical Exam  Physical Exam   Constitutional: She is oriented to person, place, and time  No distress  frail   HENT:   Head: Normocephalic and atraumatic     Right Ear: External ear normal    Left Ear: External ear normal    Mouth/Throat: Oropharyngeal exudate (mucous membranes tacky) present  Eyes: Conjunctivae and EOM are normal  Pupils are equal, round, and reactive to light  Right eye exhibits no discharge  Left eye exhibits no discharge  Neck: No tracheal deviation present  Cardiovascular:   tachycardic   Pulmonary/Chest: Effort normal  No stridor  No respiratory distress  Abdominal: Soft  She exhibits distension  There is tenderness  Musculoskeletal: She exhibits no edema (worse in RUE)  Neurological: She is alert and oriented to person, place, and time  No cranial nerve deficit  Skin: Skin is warm and dry  No rash noted  She is not diaphoretic  No erythema  Wan coloration   Psychiatric: She has a normal mood and affect  Her behavior is normal  Judgment and thought content normal        Lab Results:   I have personally reviewed pertinent labs  , CBC:   Lab Results   Component Value Date    WBC 18 43 (H) 04/27/2018    HGB 8 0 (L) 04/27/2018    HCT 26 4 (L) 04/27/2018    MCV 92 04/27/2018     04/27/2018    MCH 27 9 04/27/2018    MCHC 30 3 (L) 04/27/2018    RDW 17 7 (H) 04/27/2018    MPV 13 2 (H) 04/27/2018   , CMP:   Lab Results   Component Value Date     (L) 04/27/2018    K 4 3 04/27/2018    CL 99 (L) 04/27/2018    CO2 25 04/27/2018    ANIONGAP 11 04/27/2018    BUN 22 04/27/2018    CREATININE 2 42 (H) 04/27/2018    GLUCOSE 105 04/27/2018    CALCIUM 8 3 04/27/2018    AST 10 04/27/2018    ALT <6 (L) 04/27/2018    ALKPHOS 93 04/27/2018    PROT 5 8 (L) 04/27/2018    BILITOT 1 01 (H) 04/27/2018    EGFR 19 04/27/2018   Stable K+, but dramatically worse renal indices    Imaging Studies: none pertinent  EKG, Pathology, and Other Studies: none    Counseling / Coordination of Care  Total floor / unit time spent today 35+ minutes  Greater than 50% of total time was spent with the patient and / or family counseling and / or coordination of care   A description of the counseling / coordination of care:adjustment of parenteral controlled substances for advanced pain and symptoms; consideration of hospice cares

## 2018-04-29 VITALS
SYSTOLIC BLOOD PRESSURE: 93 MMHG | HEART RATE: 76 BPM | OXYGEN SATURATION: 93 % | RESPIRATION RATE: 25 BRPM | BODY MASS INDEX: 15.35 KG/M2 | TEMPERATURE: 97.4 F | HEIGHT: 66 IN | DIASTOLIC BLOOD PRESSURE: 36 MMHG | WEIGHT: 95.5 LBS

## 2018-04-29 PROBLEM — R16.0 LIVER MASS: Status: ACTIVE | Noted: 2018-04-29

## 2018-04-29 LAB
ATRIAL RATE: 144 BPM
QRS AXIS: 55 DEGREES
QRSD INTERVAL: 72 MS
QT INTERVAL: 224 MS
QTC INTERVAL: 390 MS
T WAVE AXIS: 131 DEGREES
VENTRICULAR RATE: 183 BPM

## 2018-04-29 PROCEDURE — 93010 ELECTROCARDIOGRAM REPORT: CPT | Performed by: INTERNAL MEDICINE

## 2018-04-29 RX ADMIN — FENTANYL CITRATE 25 MCG: 50 INJECTION, SOLUTION INTRAMUSCULAR; INTRAVENOUS at 09:23

## 2018-04-29 NOTE — PROGRESS NOTES
Progress Note - Critical Care   Karli Reed 74 y o  female MRN: [id number]  Unit/Bed#: [facility] 415-01 Encounter: [id number]    Attending Physician: Jayden Hernandez MD      ______________________________________________________________________  Assessment and Plan:   Principal Problem:    Weakness  Active Problems:    Atrial fibrillation with rapid ventricular response (HCC)    End stage renal disease ([address] )    Weight loss    Symptomatic anemia    Elevated troponin    Severe protein-calorie malnutrition (HCC)    Goals of care, counseling/discussion  Resolved Problems:    * No resolved hospital problems  *        Neuro: intact  Cont neuro checks  Cont comfort care measures  CV: cont monitor for now  Pulm: nc o2 for comfort    GI: cont pain meds  ppi  Lactulose  : anuric  No more HD  Cont comfort measures  ID: no obvious infxn at this time  Heme: stable at this time  Endo: monitor glucose  Msk/Skin: local wound care  Reposition q 2h  Disposition: plan to transfer to hospice house today  Code Status: Level 4 - Comfort Care    Counseling / Coordination of Care  Total time spent today 23 minutes  Greater than 50% of total time was spent with the patient and / or family counseling and / or coordination of care  A description of the counseling / coordination of care: f/u discussion regarding hospice and care throughout hospital stay   ______________________________________________________________________    Chief Complaint: still with abd pain  Controlled with meds  Slept t/o night  Pt states she is comfortable  24 Hour Events:     Review of Systems   Constitutional: Positive for fatigue  Negative for fever  HENT: Negative  Eyes: Negative  Respiratory: Negative  Cardiovascular: Negative  Gastrointestinal: Positive for abdominal distention and abdominal pain  Endocrine: Negative  Genitourinary: Negative  Musculoskeletal: Negative  Skin: Negative  Neurological: Negative       ______________________________________________________________________    Physical Exam:   Neuro:  [place]  Comfortable in bed  heent:  Right ej piv  No jvd   perrl  cv:  rrr-rgm  resp:  cta b/l  Decrease b/l bases  abd: distended  +irregular liver mass  Ext:  Cachectic   -edema  +pulses  ______________________________________________________________________  Vitals:    04/29/18 0115 04/29/18 0215 04/29/18 0315 04/29/18 0415   BP: (!) 100/49 114/51 (!) 112/49 (!) 105/48   Pulse: 64 68 60 62   Resp: 14 19 17 (!) 25   Temp:       TempSrc:       SpO2:       Weight:       Height:           Temperature:   No data recorded  Current Temperature: (!) 97 °F (36 1 °C)  Weights:   IBW: 58 15 kg    Body mass index is 15 65 kg/m²  Weight (last 2 days)     None         SpO2: SpO2: 92 %  Intake and Outputs:  I/O       04/27 0701 - 04/28 0700 04/28 0701 - 04/29 0700 04/29 0701 - 04/30 0700    P  O  320      I V  (mL/kg) 1064 1 (24 6) 356 2 (8 2)     Other 150      IV Piggyback 390 100     Total Intake(mL/kg) 1924 1 (44 4) 456 2 (10 5)     Urine (mL/kg/hr)  0 (0)     Other 263 (0 3)      Stool       Total Output 263 0      Net +1661 1 +456 2                   Nutrition:        Diet Orders            Start     Ordered    04/25/18 1537  Diet Regular; Regular House  Diet effective now     Question Answer Comment   Diet Type Regular    Regular Regular House    RD to adjust diet per protocol?  Yes        04/25/18 1537    04/25/18 1537  Dietary nutrition supplements  Once     Comments:  Flavor of choice   Question Answer Comment   Select Supplement: Ensure Enlive-Strawberry    Frequency Breakfast, Lunch, Dinner        04/25/18 1537          Labs:     Results from last 7 days  Lab Units 04/27/18  1707 04/27/18  0446 04/25/18  0518 04/24/18  0458 04/23/18  0807   WBC Thousand/uL 18 43* 5 72 5 64 7 14 9 48   HEMOGLOBIN g/dL 8 0* 8 2* 7 7* 8 4* 8 9*   HEMATOCRIT % 26 4* 25 9* 25 2* 26 1* 28 5*   PLATELETS Thousands/uL 188 70* 75* 83* 124*   NEUTROS PCT %  --   --  87* 88* 89*   MONOS PCT %  --   --  4 6 4       Results from last 7 days  Lab Units 04/27/18  1707 04/27/18  0446 04/25/18  0519   SODIUM mmol/L 135* 135* 135*   POTASSIUM mmol/L 4 3 4 7 4 4   CHLORIDE mmol/L 99* 99* 98*   CO2 mmol/L 25 28 28   BUN mg/dL 22 37* 40*   CREATININE mg/dL 2 42* 3 49* 3 85*   CALCIUM mg/dL 8 3 8 5 8 9   TOTAL PROTEIN g/dL 5 8*  --   --    BILIRUBIN TOTAL mg/dL 1 01*  --   --    ALK PHOS U/L 93  --   --    ALT U/L <6*  --   --    AST U/L 10  --   --    GLUCOSE RANDOM mg/dL 105 76 81       Results from last 7 days  Lab Units 04/27/18  1707 04/24/18  0458 04/23/18  0807   MAGNESIUM mg/dL 2 0 2 0 2 0     Lab Results   Component Value Date    PHOS 3 8 04/24/2018    PHOS 4 8 (H) 04/23/2018    PHOS 3 6 04/22/2018        Results from last 7 days  Lab Units 04/27/18  1711   INR  2 12*   PTT seconds 34       0  Lab Value Date/Time   TROPONINI 0 08 (H) 04/27/2018 1707   TROPONINI 0 09 (H) 04/20/2018 2331   TROPONINI 0 09 (H) 04/20/2018 1908   TROPONINI 0 08 (H) 04/20/2018 1453       Results from last 7 days  Lab Units 04/27/18  1707 04/22/18  1446   LACTIC ACID mmol/L 3 8* 2 1*     ABG:No results found for: PHART, AET9YXK, PO2ART, FRV9PDV, Z0NDQZMD, BEART, SOURCE  Imaging: no new I have personally reviewed pertinent films in PACS  Micro:  No results found for: Kaity [employer], WOUNDCULT, SPUTUMCULTUR  Allergies: No Known Allergies  Medications:   Scheduled Meds:  Current Facility-Administered Medications:  acetaminophen 650 mg Oral Q6H PRN Eugene Duong PA-C    calcium carbonate 1,000 mg Oral Daily PRN Eugene Duong PA-C    cinacalcet 30 mg Oral Daily With Breakfast Eugene Duong PA-C    fentanyl citrate (PF) 25 mcg Intravenous Q1H PRN Sean Laura PA-C    hydrocortisone sodium succinate 50 mg Intravenous BID (AM & Afternoon) John Pondera    lactulose 10 g Oral BID John Pondera    mirtazapine 15 mg Oral HS Travis Loco Damion Reis MD    ondansetron 8 mg Intravenous TID Heather Stubbs Last Rate: 8 mg (04/28/18 2301)   pantoprazole 40 mg Oral Early Morning Melissa Dupree PA-C    phenylephine  mcg/min Intravenous Titrated Tami Lin PA-C Last Rate: Stopped (04/28/18 1803)   promethazine 12 5 mg Intravenous Q6H PRN Fran Moore MD    sevelamer 800 mg Oral TID With Meals Melissa Dupree PA-C    sodium chloride   Code/Trauma/Sedation Med Fran Moore MD    sucralfate 1 g Oral 4x Daily (AC & HS) John Warren      Continuous Infusions:  phenylephine  mcg/min Last Rate: Stopped (04/28/18 1803)     PRN Meds:    acetaminophen 650 mg Q6H PRN   calcium carbonate 1,000 mg Daily PRN   fentanyl citrate (PF) 25 mcg Q1H PRN   promethazine 12 5 mg Q6H PRN   sodium chloride  Code/Trauma/Sedation Med     VTE Pharmacologic Prophylaxis: venodunes  VTE Mechanical Prophylaxis: sequential compression device  Invasive lines and devices: Invasive Devices     Peripheral Intravenous Line            Peripheral IV 04/27/18 Right External Jugular 1 day          Line            Hemodialysis AV Fistula Left -- days                     Portions of the record may have been created with voice recognition software  Occasional wrong word or "sound a like" substitutions may have occurred due to the inherent limitations of voice recognition software  Read the chart carefully and recognize, using context, where substitutions have occurred      Michael Powell PA-C

## 2018-04-29 NOTE — DISCHARGE SUMMARY
Discharge Summary - Shelton Dorsey 74 y o  female MRN: [id number]    Unit/Bed#: [facility] 415-01 Encounter: [id number]    Admission Date:   Admission Orders     Ordered        04/20/18 1547  Inpatient Admission (expected length of stay for this patient is greater than two midnights)  Once               Admitting Diagnosis: Weakness [R53 1]  ESRD (end stage renal disease) ([facility] [address] ) [N18 6]    HPI: 75 yo female with poor appetite and weight loss  Procedures Performed:   Orders Placed This Encounter   Procedures    ED ECG Documentation Only       Summary of Hospital Course: hx of esrd 2/2 polycystic kidney disease on HD  On 4/20 Pt came to hosp for weakness, fatigue and loss of appetite for several weeks  Increasing fortune  Pt was recently in [facility] with w/u for anemia and received transfusions  GI w/u showed nonbleeding rectal ulcer  Pt with signinicant decline in functional status and has been followed t/o stay with GI, nephro, palliative care  Pt had an episode of vtach and rapid afib that required her to be on pressors and transferred to icu  Pt does not want any invasive procedure or continued dialysis  Pt wants comfort care and will be transferred to hospice today  Pt declined blood draws or new piv  Significant Findings, Care, Treatment and Services Provided: will cont with hospice care  Discharge Diagnosis: failure to thrive   esrd  Resolved Problems  Date Reviewed: 4/29/2018    None          Condition at Discharge: poor         Discharge instructions/Information to patient and family:   See after visit summary for information provided to patient and family  Provisions for Follow-Up Care:  See after visit summary for information related to follow-up care and any pertinent home health orders  PCP: Burke Garces MD    Disposition: hospice    Planned Readmission: No    Discharge Statement   I spent 32 minutes discharging the patient  This time was spent on the day of discharge   I had direct contact with the patient on the day of discharge  Additional documentation is required if more than 30 minutes were spent on discharge  Discharge Medications:  See after visit summary for reconciled discharge medications provided to patient and family

## 2018-04-29 NOTE — PROGRESS NOTES
Report called to Nina Denton at [facility]  Pt AVS and paperwork printed and ready for EMS pickup at 1100

## 2018-04-29 NOTE — PROGRESS NOTES
Patient is resting comfortably with family at bedside  Denies pain/nausea at this time  Will continue to monitor

## 2018-04-29 NOTE — SOCIAL WORK
Patient's family requesting hospice referral  Referral placed in [facility]  Hospice liaison [facility] met with family to discuss  CM arranged transport with [facility] for 11 am   CM notified hospice liaison and RN and staff advised  Medical necessity completed  No other CM needs identified

## 2018-04-30 LAB
ATRIAL RATE: 122 BPM
ATRIAL RATE: 174 BPM
QRS AXIS: 100 DEGREES
QRS AXIS: 57 DEGREES
QRSD INTERVAL: 82 MS
QRSD INTERVAL: 92 MS
QT INTERVAL: 236 MS
QT INTERVAL: 354 MS
QTC INTERVAL: 413 MS
QTC INTERVAL: 475 MS
T WAVE AXIS: 210 DEGREES
T WAVE AXIS: 228 DEGREES
VENTRICULAR RATE: 108 BPM
VENTRICULAR RATE: 184 BPM

## 2018-04-30 PROCEDURE — 93010 ELECTROCARDIOGRAM REPORT: CPT | Performed by: INTERNAL MEDICINE

## 2018-12-31 NOTE — CONSULTS
Consultation - Palliative and Supportive Care   Quynh Vega 74 y o  female [id number]    Assessment:     Patient Active Problem List   Diagnosis    [facility]    [facility] stage renal disease ([facility]    Hemodialysis patient ([facility] )    Essential hypertension    Polycystic kidney    Hyperlipidemia    Anemia, normocytic normochromic    Weakness    Weight loss    Symptomatic anemia    Elevated troponin    Osteoporosis    Dyslipidemia    Ambulatory dysfunction         Plan:  1  Symptom management -     Anorexia/cachexia - multifactorial in setting of ESRD:  Patient agrees to trial of appetite stimulant  Will initiate mirtazipine 15mg qHS  This may help as well if there is an underlying mood disorder as well  Also low-dose steroids for the short term  2  Goals - after some discussion, patient has decided to be DNR but okay with short trial of intubation  She would not like a tracheostomy  She is comfortable with her son and daughter being her decision makers  She is very interested in filling advanced directives with her children, and I will request social work follow-up on this over the week  I will change her code status  Code Status: level two   Decisional apparatus:  Patient is competent on my exam today  If competence is lost, patient's substitute decision maker would default to adult children by [place] 169  Advance Directive / Living Will / POLST:  None on file         We appreciate the invitation to be involved in this patient's care  We will continue to follow  Please do not hesitate to reach our on call provider through our clinic answering service at  should you have acute symptom control concerns        IDENTIFICATION:       Inpatient consult to Palliative Care     Performed by  Nichol Randall     Authorized by [facility]            Physician Requesting Consult: Yun Ruvalcaba MD  Reason for Consult / Principal Problem: symptom mgmt, goals of Progress Notes by Heather Hodges RN, CDE at 12/19/17 09:51 AM     Author:  Heather Hodges RN, CDE Service:  (none) Author Type:  Registered Nurse-Ancillary     Filed:  12/19/17 01:40 PM Encounter Date:  12/18/2017 Status:  Signed     :  Heather Hodges RN, CDE (Registered Nurse-Ancillary)            Tracking was updated.  No lab order necessary. Anticipated INR recheck date via home health/home monitoring:[EK1.1T] 12/29/17[EK1.1M].  INR results will be called/faxed in.  INR:[EK1.1T]  2.1[EK1.1M] on[EK1.1T] 12/18/17[EK1.1M]  Last office visit with [EK1.1T] Eleanor[EK1.1M] on[EK1.1T] 9/7/17[EK1.1M]  Anticoagulation Episode Summary     Current INR goal 2.0-3.0        Anticoag Order Information 5/5/2017   ANTICOAG CLI Yes   ACC REFERRAL INDICATION FOR ANTICOAGULATION VENOUS THROMBOEMBOLIC DISEASE   Comment chronic atrial fibrillation    ACC REFERRAL DATE OF EVENT 1/31/2014   ACC REFERRAL INTENDED INR -   ACC REFERRAL INTENDED DURATION TX LONG TERM-TO BE RENEWED ANNUALLY   Comment Per Dr. Webster[EK1.2T]           Revision History        User Key Date/Time User Provider Type Action    > EK1.2 12/19/17 01:40 PM Heather Hodges RN,ED Registered Nurse-Ancillary Sign     EK1.1 12/19/17 09:51 AM Heather Hodges RN,ED Registered Nurse-Ancillary     M - Manual, T - Template             care  Hx and PE limited by: none    HISTORY OF PRESENT ILLNESS:       Jack Fernandez is a 74 y o  female with end-stage renal disease, anemia of chronic disease who presented with weakness  Patient was found to be symptomatically anemic  Further workup suggested a 20 lb weight loss  She also had a mild troponinemia  She is also being worked up for an occult malignancy  Patient reports that she has had a significant decline in functional status and energy as well as appetite over several months  From a broader standpoint, she reports that she has had a "rough couple of years "  Regarding her appetite, she states that she has no significant desire to eat  She is attempting to eat more as she understands that this could be beneficial, but has trouble finding the drive  She also reports low energy  She does not admit to being overtly depressed  She states that she understands that she will likely not be able to live independently, and will require skilled nursing level of care  Recurrent family discussion held with patient's son and daughter at bedside  We visited patient's overall condition, and discussed some of the issues she is faced with including renal failure and malnutrition  Extensive code status discussion held in which I explained the process of CPR  Review of Systems   Constitution: Positive for decreased appetite, weakness and weight loss  All other systems reviewed and are negative  Past Medical History:   Diagnosis Date    Chronic kidney disease     Dyslipidemia      History reviewed  No pertinent surgical history  Social History     Social History    Marital status:      Spouse name: N/A    Number of children: N/A    Years of education: N/A     Occupational History    Not on file       Social History Main Topics    Smoking status: Never Smoker    Smokeless tobacco: Never Used    Alcohol use No    Drug use: No    Sexual activity: Not on file     Other Topics Concern    Not on file     Social History Narrative    No narrative on file     History reviewed  No pertinent family history  MEDICATIONS / ALLERGIES:    all current active meds have been reviewed and current meds:   Current Facility-Administered Medications   Medication Dose Route Frequency    acetaminophen (TYLENOL) tablet 650 mg  650 mg Oral Q6H PRN    calcium carbonate (TUMS) chewable tablet 1,000 mg  1,000 mg Oral Daily PRN    cholecalciferol (VITAMIN D3) tablet 1,000 Units  1,000 Units Oral Daily    cinacalcet (SENSIPAR) tablet 30 mg  30 mg Oral Daily With Breakfast    [START ON 4/23/2018] epoetin beth (EPOGEN,PROCRIT) injection 4,000 Units  4,000 Units Intravenous After Dialysis    And    [START ON 4/23/2018] epoetin beth (EPOGEN,PROCRIT) injection 3,000 Units  3,000 Units Intravenous After Dialysis    heparin (porcine) injection 2,000 Units  2,000 Units Intravenous PRN    heparin (porcine) subcutaneous injection 5,000 Units  5,000 Units Subcutaneous Q8H [address]    mirtazapine (REMERON) tablet 15 mg  15 mg Oral HS    ondansetron (ZOFRAN) injection 4 mg  4 mg Intravenous Q6H PRN    pantoprazole (PROTONIX) EC tablet 40 mg  40 mg Oral Early Morning    polyethylene glycol (MIRALAX) packet 17 g  17 g Oral Daily PRN    pravastatin (PRAVACHOL) tablet 40 mg  40 mg Oral Daily With Dinner    sevelamer (RENAGEL) tablet 800 mg  800 mg Oral TID With Meals       No Known Allergies    OBJECTIVE:    Physical Exam  Physical Exam   Constitutional: She is oriented to person, place, and time  She appears cachectic  She is cooperative  She has a sickly appearance  No distress  HENT:   Right Ear: External ear normal    Left Ear: External ear normal    Nose: Nose normal    Temporal wasting   Eyes: Lids are normal  Right eye exhibits no discharge  Left eye exhibits no discharge  Neck: No JVD present  Cardiovascular: Normal rate, regular rhythm and normal heart sounds      Pulmonary/Chest: Effort normal  No respiratory distress  Abdominal: Soft  She exhibits no distension  There is no tenderness  Musculoskeletal:   No edema   Neurological: She is alert and oriented to person, place, and time  Skin: Skin is warm and dry  She is not diaphoretic  No pallor  Psychiatric: She has a normal mood and affect  Nursing note and vitals reviewed  Lab Results:   I have personally reviewed pertinent labs  , CBC:   Lab Results   Component Value Date    WBC 5 83 04/22/2018    HGB 8 4 (L) 04/22/2018    HCT 26 6 (L) 04/22/2018    MCV 91 04/22/2018    PLT 71 (L) 04/22/2018    MCH 28 6 04/22/2018    MCHC 31 6 04/22/2018    RDW 18 3 (H) 04/22/2018    MPV 11 6 04/21/2018    NRBC 0 04/22/2018   , CMP:   Lab Results   Component Value Date     04/22/2018    K 4 4 04/22/2018     04/22/2018    CO2 28 04/22/2018    ANIONGAP 8 04/22/2018    BUN 20 04/22/2018    CREATININE 2 87 (H) 04/22/2018    GLUCOSE 79 04/22/2018    CALCIUM 9 0 04/22/2018    EGFR 15 04/22/2018     Imaging Studies:  CT chest abdomen and pelvis showing severe emphysema, moderate abdominal pelvic ascites, no obvious malignancy  EKG, Pathology, and Other Studies: n/a    Counseling / Coordination of Care  Total floor / unit time spent today 65+ minutes  Greater than 50% of total time was spent with the patient and / or family counseling and / or coordination of care   A description of the counseling / coordination of care:  Goals of care discussion, advance care planning, symptom assessment, medication changes

## 2019-02-09 NOTE — PHYSICAL THERAPY NOTE
Physical Therapy Evaluation     Patient's Name: Caridad Marley    Admitting Diagnosis  Weakness [R53 1]  ESRD (end stage renal disease) ([place] [address] ) [N18 6]    Problem List  Patient Active Problem List   Diagnosis    Atrial fibrillation ([place] [address] )    End stage renal disease ([place] [address] )    Hemodialysis patient ([place] [address] )    Essential hypertension    Polycystic kidney    Hyperlipidemia    Anemia, normocytic normochromic    Weakness    Weight loss    Symptomatic anemia    Elevated troponin    Osteoporosis    Dyslipidemia    Ambulatory dysfunction    Severe protein-calorie malnutrition ([place] [address] )       Past Medical History  Past Medical History:   Diagnosis Date    Chronic kidney disease     Dyslipidemia        Past Surgical History  History reviewed  No pertinent surgical history  04/26/18 0915   Note Type   Note type Eval only   Pain Assessment   Pain Assessment 0-10   Pain Score 4   Pain Type Acute pain   Pain Location Abdomen   Home Living   Type of Home Apartment   Home Layout One level   Bathroom Shower/Tub Tub/shower unit   Bathroom Toilet Standard   Bathroom Equipment Grab bars in [address]   Prior Function   Level of [place] Needs assistance with ADLs and functional mobility; Needs assistance with IADLs   Lives With Alone   Receives Help From Family   ADL Assistance Needs assistance   IADLs Needs assistance   Falls in the last 6 months 0   Vocational Retired   Restrictions/Precautions   [facility] Bearing Precautions Per Order No   Braces or Orthoses (NONE)   Other Precautions Pain; Fall Risk   General   Family/Caregiver Present No   Cognition   Overall Cognitive Status WFL   RUE Assessment   RUE Assessment WFL   LUE Assessment   LUE Assessment WFL   RLE Assessment   RLE Assessment X   Strength RLE   RLE Overall Strength 3-/5   LLE Assessment   LLE Assessment X   Strength LLE   LLE Overall Strength 3-/5   Bed Mobility   Supine to Sit 4  Minimal assistance   Additional items Assist x 1; Increased time required   Sit to Supine 5  Supervision   Additional items Increased time required   Transfers   Sit to Stand 4  Minimal assistance   Additional items Assist x 1   Stand to Sit 4  Minimal assistance   Additional items Assist x 1   Ambulation/Elevation   Gait pattern Excessively slow;Decreased foot clearance; Forward Flexion   Gait Assistance 4  Minimal assist   Additional items Assist x 1   Assistive Device Rolling walker   Distance 10 FEET   Balance   Static Sitting Fair +   Static Standing Poor +   Ambulatory Poor +   Endurance Deficit   Endurance Deficit Yes   Endurance Deficit Description DECONDITIONED   Activity Tolerance   Activity Tolerance Patient limited by fatigue   Nurse Made Aware YES   Assessment   Prognosis Fair   Problem List Decreased strength;Decreased endurance; Impaired balance;Decreased mobility; Impaired judgement;Pain   Assessment PT COMPLETED EVALUATION OF 74YEAR OLD FEMALE [facility] WITH SYMPTOMS OF WEAKNESS AND FATIGUE  DIAGNOSES INCLUDE SEVERE PROTEIN-CALORIE MALNUTRITION, ELEVATED TROPONIN, AND SYMPTOMATIC ANEMIA  PATIENT DOES HAVE CONSULT TO PALLIATIVE CARE AND [facility]  SHE AT THIS TIME EXPRESSES THE DESIRE TO GO TO A FACILITY TO THERAPY  CURRENT MEDICAL AND PHYSICAL INSTABILITIES INCLUDE PAIN, ONGOING MONITORING OF VITAL SIGNS, FALLS RISK, BED/CHAIR ALARMS, AND A REGRESSION IN FUNCTIONAL STATUS FROM BASELINE  PMH IS SIGNIFICANT FOR ESRD-HD, ANEMIA, RECTAL ULCER, GASTRITIS, AND DUODENITIS  PRIOR TO THIS ADMISSION  PATIENT RESIDED ALONE IN [facility] ([facility] WHERE SHE [place] RECEIVED ASSISTANCE WITH ADLS AND IADLS PRN FROM FAMILY  CURRENT IMPAIRMENTS INCLUDE SEVERE DECONDITIONING WITH REDUCED STRENGTH, ACTIVITY TOLERANCE, BALANCE, AND PAIN  DURING PT EVALUATION PATIENT REQUIRED MIN-AX1 FOR SUPINE-->SIT TRANSFER, SIT<-->STAND TRANSFER, AND AMBULATION   PATIENT AMBULATED 5 FEET X 2 W/ RW PRESENTING WITH REDUCED GAIT SPEED AND FORWARD TRUNK FLEXION  THIS PATIENT IS FUNCTIONING BELOW HER BASELINE AND WOULD BENEFIT FROM STR UPON D/C  SHE WILL BENEFIT FROM CONTINUED SKILLED INPT PT THIS ADMISSION TO ACHIEVE MAXIMAL FUNCTION AND SAFETY  Barriers to Discharge Decreased caregiver support   Barriers to Discharge Comments RESIDES ALONE   Goals   Patient Goals TO GO TO REHAB    LTG Expiration Date 05/10/18   Long Term Goal #1 10-14 DAYS: 1) COMPLETE BED MOBILITY MOD-I; 2) PERFORM SIT<-->STAND TRANSFER MOD-I; 3) AMBULATE 300 FEET MOD-I W/ LEAST RESTRICTIVE DEVICE; 4) IMPROVE B/L LE STRENGTH BY 1/2 GRADE; 5) IMPROVE ACTIVITY TOLERANCE TO 45 MINUTES    Treatment Day 0   Plan   Treatment/Interventions Functional transfer training;LE strengthening/ROM; Therapeutic exercise; Endurance training;Patient/family training;Equipment eval/education; Bed mobility;Gait training;OT   PT Frequency 5x/wk   Recommendation   Recommendation Short-term skilled PT   Equipment Recommended Walker  (RW)   PT - OK to Discharge Yes  (TO STR WHEN MED ANDREA )   Barthel Index   Feeding 5   Bathing 0   Grooming Score 5   Dressing Score 5   Bladder Score 10   Bowels Score 10   Toilet Use Score 5   Transfers (Bed/Chair) Score 10   Mobility (Level Surface) Score 0   Stairs Score 0   Barthel Index Score 50         Meena Murrieta, PT WDL

## 2023-03-23 NOTE — ASSESSMENT & PLAN NOTE
· Multifactorial, likely related to very poor nutritional status with weight loss as well as symptomatic anemia  · Work up of malignancy is pending  · PT and OT to evaluate patient  · Treat underlying causes declines